# Patient Record
Sex: FEMALE | Race: WHITE | NOT HISPANIC OR LATINO | Employment: FULL TIME | URBAN - METROPOLITAN AREA
[De-identification: names, ages, dates, MRNs, and addresses within clinical notes are randomized per-mention and may not be internally consistent; named-entity substitution may affect disease eponyms.]

---

## 2017-01-30 ENCOUNTER — TELEPHONE (OUTPATIENT)
Dept: INTERNAL MEDICINE | Facility: CLINIC | Age: 22
End: 2017-01-30

## 2017-01-30 DIAGNOSIS — F11.90 NARCOTIC DRUG USE: Primary | ICD-10-CM

## 2017-02-03 ENCOUNTER — OFFICE VISIT (OUTPATIENT)
Dept: INTERNAL MEDICINE | Facility: CLINIC | Age: 22
End: 2017-02-03

## 2017-02-03 VITALS
RESPIRATION RATE: 20 BRPM | BODY MASS INDEX: 21.53 KG/M2 | TEMPERATURE: 97.5 F | HEART RATE: 72 BPM | WEIGHT: 129.38 LBS | DIASTOLIC BLOOD PRESSURE: 80 MMHG | SYSTOLIC BLOOD PRESSURE: 120 MMHG

## 2017-02-03 DIAGNOSIS — R05.9 COUGH: ICD-10-CM

## 2017-02-03 DIAGNOSIS — J01.80 OTHER ACUTE SINUSITIS: ICD-10-CM

## 2017-02-03 DIAGNOSIS — R41.840 ATTENTION AND CONCENTRATION DEFICIT: ICD-10-CM

## 2017-02-03 DIAGNOSIS — R41.840 ATTENTION OR CONCENTRATION DEFICIT: Primary | ICD-10-CM

## 2017-02-03 PROCEDURE — 99214 OFFICE O/P EST MOD 30 MIN: CPT | Performed by: INTERNAL MEDICINE

## 2017-02-03 RX ORDER — LORATADINE 10 MG/1
CAPSULE, LIQUID FILLED ORAL EVERY MORNING
COMMUNITY
End: 2017-04-07

## 2017-02-03 RX ORDER — BENZONATATE 100 MG/1
100 CAPSULE ORAL 3 TIMES DAILY PRN
Qty: 50 CAPSULE | Refills: 0 | Status: SHIPPED | OUTPATIENT
Start: 2017-02-03 | End: 2017-04-17

## 2017-02-03 RX ORDER — CETIRIZINE HYDROCHLORIDE 10 MG/1
10 TABLET ORAL
COMMUNITY
End: 2017-04-17 | Stop reason: ALTCHOICE

## 2017-02-03 RX ORDER — AZITHROMYCIN 250 MG/1
TABLET, FILM COATED ORAL
Qty: 6 TABLET | Refills: 0 | Status: SHIPPED | OUTPATIENT
Start: 2017-02-03 | End: 2017-04-07

## 2017-02-03 NOTE — PROGRESS NOTES
Subjective       Loyda Richmond is a 21 y.o. female.     Chief Complaint   Patient presents with   • ADD   • Cough     tickle cough at night  itchy throat  hoarseness    ADHD (Follow-Up): The patient's ADHD subtype is the predominantly inattentive type. Her ADHD has been well controlled since the last visit. She has had no significant interval events.   Target Symptoms:   1. Hyperactive behavior is denied.   2. Impulsive behavior is denied.   3. Difficulty concentrating is denied.   Medications: Vyvanse daily. The patient takes her medications all of the time.   Medication side effects include no anorexia, no headache, no tics, no irritability, no problems sleeping, no weight loss, no abdominal pain, no nausea and no suicidal ideation.     Cough   This is a new problem. Episode onset: 5 days ago. The problem has been gradually worsening. The cough is productive of purulent sputum (dry and hacky). Associated symptoms include headaches, nasal congestion, rhinorrhea (clear, yellow, and green) and a sore throat. Pertinent negatives include no chest pain, chills, ear pain, eye redness, fever, myalgias, postnasal drip, rash, shortness of breath or wheezing. Treatments tried: Dayquil, Nyquil, Mucinex, and Advil. The treatment provided mild relief. Her past medical history is significant for environmental allergies. There is no history of asthma.        Current Outpatient Prescriptions on File Prior to Visit   Medication Sig Dispense Refill   • clindamycin-benzoyl peroxide (BENZACLIN) 1-5 % gel Apply  topically 2 (two) times a day. 50 g 0   • desogestrel-ethinyl estradiol (KARIVA) 0.15-0.02/0.01 MG (21/5) per tablet Take 1 tablet by mouth daily. 28 tablet 11   • fluticasone (FLONASE) 50 MCG/ACT nasal spray into each nostril.     • lisdexamfetamine (VYVANSE) 50 MG capsule Take 1 capsule by mouth Daily. 30 capsule 0     No current facility-administered medications on file prior to visit.          The following portions of the  patient's history were reviewed and updated as appropriate: allergies, current medications, past family history, past medical history, past social history, past surgical history and problem list.    Review of Systems   Constitutional: Negative for appetite change, chills, fatigue and fever.   HENT: Positive for rhinorrhea (clear, yellow, and green), sore throat and voice change (hoarseness). Negative for ear pain, postnasal drip, sinus pressure and sneezing.    Eyes: Negative for pain, discharge, redness and itching.   Respiratory: Positive for cough. Negative for shortness of breath and wheezing.    Cardiovascular: Negative for chest pain.   Gastrointestinal: Negative for abdominal pain, diarrhea, nausea and vomiting.   Musculoskeletal: Negative for arthralgias, joint swelling and myalgias.   Skin: Negative for rash.   Allergic/Immunologic: Positive for environmental allergies.   Neurological: Positive for headaches.        Denies tics.     Hematological: Negative for adenopathy.   Psychiatric/Behavioral: Negative for sleep disturbance and suicidal ideas. The patient is not nervous/anxious.         Denies depression.         Objective       Blood pressure 120/80, pulse 72, temperature 97.5 °F (36.4 °C), temperature source Temporal Artery , resp. rate 20, weight 129 lb 6 oz (58.7 kg).      Physical Exam   Constitutional: She appears well-developed and well-nourished.   HENT:   Head: Normocephalic and atraumatic.   Right Ear: Tympanic membrane, external ear and ear canal normal.   Left Ear: Tympanic membrane, external ear and ear canal normal.   Mouth/Throat: Oropharynx is clear and moist and mucous membranes are normal. No oral lesions.   Tonsils normal.  No sinus tenderness to palpation.  The patient is hoarse.   Eyes: Conjunctivae are normal.   Neck: Normal range of motion. Neck supple.   Cardiovascular: Normal rate and regular rhythm.    No murmur heard.  Pulmonary/Chest: Effort normal and breath sounds normal.    Lymphadenopathy:     She has no cervical adenopathy.   Skin: No rash noted.   Psychiatric: She has a normal mood and affect.   Nursing note and vitals reviewed.    Counseling was given to patient for the following topics: instructions for management, patient and family education, impressions, risks and benefits of treatment options and importance of treatment compliance . Total time of the encounter was 20 minutes and 12 minutes was spend counseling.      Assessment / Plan:    Diagnoses and all orders for this visit:    Attention or concentration deficit    Other acute sinusitis  -     azithromycin (ZITHROMAX Z-JUSTIN) 250 MG tablet; Take 2 tablets the first day, then 1 tablet daily for 4 days.    Cough  -     benzonatate (TESSALON PERLES) 100 MG capsule; Take 1 capsule by mouth 3 (Three) Times a Day As Needed for cough.    Attention and concentration deficit  -     lisdexamfetamine (VYVANSE) 50 MG capsule; Take 1 capsule by mouth Daily.    Other orders  -     cetirizine (zyrTEC) 10 MG tablet; Take 10 mg by mouth every night at bedtime.  -     Loratadine (CLARITIN) 10 MG capsule; Take  by mouth Every Morning.    The patient was instructed in the side effects of the medication. Risks of the potential for tolerance, dependence, and addiction were discussed. The patient was instructed to take the lowest dosage of the medication, at the lowest frequency, and for the shortest period of time possible. The patient was instructed not to receive controlled substances or narcotics from other doctors, and not to giveaway or sell the medication.     The patient was instructed to abstain from illicit drug use.      Narcotics/controlled substance agreement, Charles report, and Urine Drug Screen were updated today if needed.    Return in about 3 months (around 5/3/2017) for  Annual physical, non-fasting (and Pap and follow up).

## 2017-03-03 ENCOUNTER — TELEPHONE (OUTPATIENT)
Dept: INTERNAL MEDICINE | Facility: CLINIC | Age: 22
End: 2017-03-03

## 2017-03-03 DIAGNOSIS — R41.840 ATTENTION AND CONCENTRATION DEFICIT: ICD-10-CM

## 2017-03-03 NOTE — TELEPHONE ENCOUNTER
----- Message from Jayne Fontaine sent at 3/3/2017 12:15 PM EST -----  Contact: patient  rx request: vyvanse 50 MG  Please call patient at [phone] when ready for

## 2017-04-06 ENCOUNTER — TELEPHONE (OUTPATIENT)
Dept: INTERNAL MEDICINE | Facility: CLINIC | Age: 22
End: 2017-04-06

## 2017-04-06 NOTE — TELEPHONE ENCOUNTER
----- Message from Lia Peoples MA sent at 4/6/2017  1:06 PM EDT -----    She is c/o persistant  Productive clear  cough x 1 week  Last week she had a headache .  No other symptoms.   The cough keeps her a wake a night   She has been taking Mucinex x 1 week with no relief. She is requesting something for her cough       Jose Caraballo

## 2017-04-06 NOTE — TELEPHONE ENCOUNTER
i see patient was seen back in 2/3/17 for the cough.    Since she is clinically not getting better or having recurrence, then she will need to be seen and evaluated before any medications prescribed for cough.

## 2017-04-07 ENCOUNTER — OFFICE VISIT (OUTPATIENT)
Dept: INTERNAL MEDICINE | Facility: CLINIC | Age: 22
End: 2017-04-07

## 2017-04-07 VITALS
HEART RATE: 112 BPM | TEMPERATURE: 98.3 F | SYSTOLIC BLOOD PRESSURE: 124 MMHG | RESPIRATION RATE: 18 BRPM | BODY MASS INDEX: 21.67 KG/M2 | WEIGHT: 130.25 LBS | DIASTOLIC BLOOD PRESSURE: 68 MMHG

## 2017-04-07 DIAGNOSIS — J40 BRONCHITIS: ICD-10-CM

## 2017-04-07 DIAGNOSIS — J06.9 ACUTE URI: Primary | ICD-10-CM

## 2017-04-07 PROCEDURE — 99213 OFFICE O/P EST LOW 20 MIN: CPT | Performed by: INTERNAL MEDICINE

## 2017-04-07 RX ORDER — AZITHROMYCIN 250 MG/1
TABLET, FILM COATED ORAL
Qty: 6 TABLET | Refills: 0 | Status: SHIPPED | OUTPATIENT
Start: 2017-04-07 | End: 2017-04-17

## 2017-04-07 RX ORDER — IBUPROFEN 200 MG
200 TABLET ORAL EVERY 6 HOURS PRN
COMMUNITY
End: 2018-03-21 | Stop reason: ALTCHOICE

## 2017-04-07 RX ORDER — PROMETHAZINE HYDROCHLORIDE AND CODEINE PHOSPHATE 6.25; 1 MG/5ML; MG/5ML
5 SYRUP ORAL EVERY 6 HOURS PRN
Qty: 130 ML | Refills: 0 | Status: SHIPPED | OUTPATIENT
Start: 2017-04-07 | End: 2017-04-17

## 2017-04-07 NOTE — PROGRESS NOTES
Subjective   Loyda Richmond is a 22 y.o. female.     History of Present Illness     Cough  Duration 1 week  Sx: Patient says that her cough was deep and nonproductive which then became productive mild yellow mucous.  Patient denies any fever, chills, nausea, vomiting, dyspnea on exertion, syncope, or any other systemic symptoms.    Social History: No cigarette smoking, no secondary contact    Review of Systems   All other systems reviewed and are negative.      Objective   Physical Exam   Constitutional: She appears well-developed and well-nourished.   HENT:   Head: Normocephalic.   Right Ear: External ear normal.   Left Ear: External ear normal.   Nose: Nose normal.   Mouth/Throat: Oropharynx is clear and moist.   Eyes: Conjunctivae and EOM are normal. Pupils are equal, round, and reactive to light.   Neck: Normal range of motion. Neck supple.   Cardiovascular: Normal rate, regular rhythm, normal heart sounds and intact distal pulses.    Pulmonary/Chest: Effort normal. No respiratory distress. She has no wheezes. She exhibits no tenderness.   Mild diffuse crackles in the lower bibasilar region posterior, clears with cough   Skin: Skin is warm.   Nursing note and vitals reviewed.      Assessment/Plan   Loyda was seen today for cough and uri.    Diagnoses and all orders for this visit:    Acute URI  -     phenylephrine-promethazine-codeine (PHENERGAN VC with CODEINE) 5-6.25-10 MG/5ML syrup syrup; Take 5 mL by mouth Every 6 (Six) Hours As Needed (cough).    Bronchitis  -     azithromycin (ZITHROMAX) 250 MG tablet; Take 2 tablets the first day, then 1 tablet daily for 4 days.  -     phenylephrine-promethazine-codeine (PHENERGAN VC with CODEINE) 5-6.25-10 MG/5ML syrup syrup; Take 5 mL by mouth Every 6 (Six) Hours As Needed (cough).    Supportive care  Advance diet as tolerated with emphasis on hydration.  Monitor for signs for dehydration.  Continue with Tylenol and or Motrin for fever reduction and or pain  control.  Return to clinic if symptoms do not improve.

## 2017-04-17 ENCOUNTER — OFFICE VISIT (OUTPATIENT)
Dept: INTERNAL MEDICINE | Facility: CLINIC | Age: 22
End: 2017-04-17

## 2017-04-17 VITALS
HEART RATE: 72 BPM | SYSTOLIC BLOOD PRESSURE: 114 MMHG | HEIGHT: 65 IN | WEIGHT: 126 LBS | DIASTOLIC BLOOD PRESSURE: 80 MMHG | BODY MASS INDEX: 20.99 KG/M2 | TEMPERATURE: 97.3 F | RESPIRATION RATE: 16 BRPM

## 2017-04-17 DIAGNOSIS — R41.840 ATTENTION OR CONCENTRATION DEFICIT: ICD-10-CM

## 2017-04-17 DIAGNOSIS — Z13.6 SCREENING FOR CARDIOVASCULAR CONDITION: ICD-10-CM

## 2017-04-17 DIAGNOSIS — N63.20 BREAST MASS, LEFT: ICD-10-CM

## 2017-04-17 DIAGNOSIS — Z00.00 ENCOUNTER FOR HEALTH MAINTENANCE EXAMINATION IN ADULT: Primary | ICD-10-CM

## 2017-04-17 DIAGNOSIS — Z01.419 ENCOUNTER FOR CERVICAL PAP SMEAR WITH PELVIC EXAM: ICD-10-CM

## 2017-04-17 DIAGNOSIS — R41.840 ATTENTION AND CONCENTRATION DEFICIT: ICD-10-CM

## 2017-04-17 DIAGNOSIS — J30.89 SEASONAL ALLERGIC RHINITIS DUE TO OTHER ALLERGIC TRIGGER: ICD-10-CM

## 2017-04-17 PROCEDURE — 99395 PREV VISIT EST AGE 18-39: CPT | Performed by: INTERNAL MEDICINE

## 2017-04-17 PROCEDURE — 99213 OFFICE O/P EST LOW 20 MIN: CPT | Performed by: INTERNAL MEDICINE

## 2017-04-17 RX ORDER — LEVOCETIRIZINE DIHYDROCHLORIDE 5 MG/1
5 TABLET, FILM COATED ORAL EVERY EVENING
Qty: 30 TABLET | Refills: 0
Start: 2017-04-17 | End: 2018-06-05

## 2017-04-17 NOTE — PATIENT INSTRUCTIONS
The patient agrees to return for fasting labs.    Health Maintenance, Female  Adopting a healthy lifestyle and getting preventive care can go a long way to promote health and wellness. Talk with your health care provider about what schedule of regular examinations is right for you. This is a good chance for you to check in with your provider about disease prevention and staying healthy.  In between checkups, there are plenty of things you can do on your own. Experts have done a lot of research about which lifestyle changes and preventive measures are most likely to keep you healthy. Ask your health care provider for more information.  WEIGHT AND DIET   Eat a healthy diet  · Be sure to include plenty of vegetables, fruits, low-fat dairy products, and lean protein.  · Do not eat a lot of foods high in solid fats, added sugars, or salt.  · Get regular exercise. This is one of the most important things you can do for your health.  ¨ Most adults should exercise for at least 150 minutes each week. The exercise should increase your heart rate and make you sweat (moderate-intensity exercise).  ¨ Most adults should also do strengthening exercises at least twice a week. This is in addition to the moderate-intensity exercise.    Maintain a healthy weight  · Body mass index (BMI) is a measurement that can be used to identify possible weight problems. It estimates body fat based on height and weight. Your health care provider can help determine your BMI and help you achieve or maintain a healthy weight.  · For females 20 years of age and older:      A BMI below 18.5 is considered underweight.    A BMI of 18.5 to 24.9 is normal.    A BMI of 25 to 29.9 is considered overweight.    A BMI of 30 and above is considered obese.    Watch levels of cholesterol and blood lipids  · You should start having your blood tested for lipids and cholesterol at 20 years of age, then have this test every 5 years.  · You may need to have your  cholesterol levels checked more often if:  ¨ Your lipid or cholesterol levels are high.  ¨ You are older than 50 years of age.  ¨ You are at high risk for heart disease.    CANCER SCREENING      Lung Cancer  · Lung cancer screening is recommended for adults 55-80 years old who are at high risk for lung cancer because of a history of smoking.  · A yearly low-dose CT scan of the lungs is recommended for people who:  ¨ Currently smoke.  ¨ Have quit within the past 15 years.  ¨ Have at least a 30-pack-year history of smoking. A pack year is smoking an average of one pack of cigarettes a day for 1 year.  · Yearly screening should continue until it has been 15 years since you quit.  · Yearly screening should stop if you develop a health problem that would prevent you from having lung cancer treatment.    Breast Cancer  · Practice breast self-awareness. This means understanding how your breasts normally appear and feel.  · It also means doing regular breast self-exams. Let your health care provider know about any changes, no matter how small.  · If you are in your 20s or 30s, you should have a clinical breast exam (CBE) by a health care provider every 1-3 years as part of a regular health exam.  · If you are 40 or older, have a CBE every year. Also consider having a breast X-ray (mammogram) every year.  · If you have a family history of breast cancer, talk to your health care provider about genetic screening.  · If you are at high risk for breast cancer, talk to your health care provider about having an MRI and a mammogram every year.  · Breast cancer gene (BRCA) assessment is recommended for women who have family members with BRCA-related cancers. BRCA-related cancers include:  ¨ Breast.  ¨ Ovarian.  ¨ Tubal.  ¨ Peritoneal cancers.  · Results of the assessment will determine the need for genetic counseling and BRCA1 and BRCA2 testing.  Cervical Cancer  Your health care provider may recommend that you be screened regularly  for cancer of the pelvic organs (ovaries, uterus, and vagina). This screening involves a pelvic examination, including checking for microscopic changes to the surface of your cervix (Pap test). You may be encouraged to have this screening done every 3 years, beginning at age 21.  · For women ages 30-65, health care providers may recommend pelvic exams and Pap testing every 3 years, or they may recommend the Pap and pelvic exam, combined with testing for human papilloma virus (HPV), every 5 years. Some types of HPV increase your risk of cervical cancer. Testing for HPV may also be done on women of any age with unclear Pap test results.  · Other health care providers may not recommend any screening for nonpregnant women who are considered low risk for pelvic cancer and who do not have symptoms. Ask your health care provider if a screening pelvic exam is right for you.  · If you have had past treatment for cervical cancer or a condition that could lead to cancer, you need Pap tests and screening for cancer for at least 20 years after your treatment. If Pap tests have been discontinued, your risk factors (such as having a new sexual partner) need to be reassessed to determine if screening should resume. Some women have medical problems that increase the chance of getting cervical cancer. In these cases, your health care provider may recommend more frequent screening and Pap tests.  Colorectal Cancer  · This type of cancer can be detected and often prevented.  · Routine colorectal cancer screening usually begins at 50 years of age and continues through 75 years of age.  · Your health care provider may recommend screening at an earlier age if you have risk factors for colon cancer.  · Your health care provider may also recommend using home test kits to check for hidden blood in the stool.  · A small camera at the end of a tube can be used to examine your colon directly (sigmoidoscopy or colonoscopy). This is done to check  for the earliest forms of colorectal cancer.  · Routine screening usually begins at age 50.  · Direct examination of the colon should be repeated every 5-10 years through 75 years of age. However, you may need to be screened more often if early forms of precancerous polyps or small growths are found.  Skin Cancer  · Check your skin from head to toe regularly.  · Tell your health care provider about any new moles or changes in moles, especially if there is a change in a mole's shape or color.  · Also tell your health care provider if you have a mole that is larger than the size of a pencil eraser.  · Always use sunscreen. Apply sunscreen liberally and repeatedly throughout the day.  · Protect yourself by wearing long sleeves, pants, a wide-brimmed hat, and sunglasses whenever you are outside.  HEART DISEASE, DIABETES, AND HIGH BLOOD PRESSURE   · High blood pressure causes heart disease and increases the risk of stroke. High blood pressure is more likely to develop in:    People who have blood pressure in the high end of the normal range (130-139/85-89 mm Hg).    People who are overweight or obese.    People who are .  · If you are 18-39 years of age, have your blood pressure checked every 3-5 years. If you are 40 years of age or older, have your blood pressure checked every year. You should have your blood pressure measured twice--once when you are at a hospital or clinic, and once when you are not at a hospital or clinic. Record the average of the two measurements. To check your blood pressure when you are not at a hospital or clinic, you can use:    An automated blood pressure machine at a pharmacy.    A home blood pressure monitor.  · If you are between 55 years and 79 years old, ask your health care provider if you should take aspirin to prevent strokes.  · Have regular diabetes screenings. This involves taking a blood sample to check your fasting blood sugar level.    If you are at a normal  weight and have a low risk for diabetes, have this test once every three years after 45 years of age.    If you are overweight and have a high risk for diabetes, consider being tested at a younger age or more often.  PREVENTING INFECTION   Hepatitis B  · If you have a higher risk for hepatitis B, you should be screened for this virus. You are considered at high risk for hepatitis B if:    You were born in a country where hepatitis B is common. Ask your health care provider which countries are considered high risk.    Your parents were born in a high-risk country, and you have not been immunized against hepatitis B (hepatitis B vaccine).    You have HIV or AIDS.    You use needles to inject street drugs.    You live with someone who has hepatitis B.    You have had sex with someone who has hepatitis B.    You get hemodialysis treatment.    You take certain medicines for conditions, including cancer, organ transplantation, and autoimmune conditions.  Hepatitis C  · Blood testing is recommended for:  ¨ Everyone born from 1945 through 1965.  ¨ Anyone with known risk factors for hepatitis C.  Sexually transmitted infections (STIs)  · You should be screened for sexually transmitted infections (STIs) including gonorrhea and chlamydia if:  ¨ You are sexually active and are younger than 24 years of age.  ¨ You are older than 24 years of age and your health care provider tells you that you are at risk for this type of infection.  ¨ Your sexual activity has changed since you were last screened and you are at an increased risk for chlamydia or gonorrhea. Ask your health care provider if you are at risk.    If you do not have HIV, but are at risk, it may be recommended that you take a prescription medicine daily to prevent HIV infection. This is called pre-exposure prophylaxis (PrEP). You are considered at risk if:    You are sexually active and do not regularly use condoms or know the HIV status of your partner(s).    You take  drugs by injection.    You are sexually active with a partner who has HIV.  Talk with your health care provider about whether you are at high risk of being infected with HIV. If you choose to begin PrEP, you should first be tested for HIV. You should then be tested every 3 months for as long as you are taking PrEP.   PREGNANCY   · If you are premenopausal and you may become pregnant, ask your health care provider about preconception counseling.  · If you may become pregnant, take 400 to 800 micrograms (mcg) of folic acid every day.  · If you want to prevent pregnancy, talk to your health care provider about birth control (contraception).  OSTEOPOROSIS AND MENOPAUSE   · Osteoporosis is a disease in which the bones lose minerals and strength with aging. This can result in serious bone fractures. Your risk for osteoporosis can be identified using a bone density scan.  · If you are 65 years of age or older, or if you are at risk for osteoporosis and fractures, ask your health care provider if you should be screened.  · Ask your health care provider whether you should take a calcium or vitamin D supplement to lower your risk for osteoporosis.  · Menopause may have certain physical symptoms and risks.  · Hormone replacement therapy may reduce some of these symptoms and risks.  Talk to your health care provider about whether hormone replacement therapy is right for you.   HOME CARE INSTRUCTIONS   · Schedule regular health, dental, and eye exams.  · Stay current with your immunizations.    · Do not use any tobacco products including cigarettes, chewing tobacco, or electronic cigarettes.  · If you are pregnant, do not drink alcohol.  · If you are breastfeeding, limit how much and how often you drink alcohol.  · Limit alcohol intake to no more than 1 drink per day for nonpregnant women. One drink equals 12 ounces of beer, 5 ounces of wine, or 1½ ounces of hard liquor.  · Do not use street drugs.  · Do not share  needles.  · Ask your health care provider for help if you need support or information about quitting drugs.  · Tell your health care provider if you often feel depressed.  · Tell your health care provider if you have ever been abused or do not feel safe at home.     This information is not intended to replace advice given to you by your health care provider. Make sure you discuss any questions you have with your health care provider.     Document Released: 07/02/2012 Document Revised: 01/08/2016 Document Reviewed: 11/19/2014  [employer] Interactive Patient Education ©2016 [employer]

## 2017-04-17 NOTE — PROGRESS NOTES
Subjective   History of Present Illness    History obtained from the patient.    ADHD (Follow-Up): The patient's ADHD subtype is the predominantly inattentive type. Her ADHD has been well controlled since the last visit.   Interval Events:  She has increased stress at work, and is having problems with focus and concentration.  She is easily distracted.   Target Symptoms:   1. Hyperactive behavior is denied.   2. Impulsive behavior is denied.   3. Difficulty concentrating is denied.   Medications: Vyvanse daily. The patient takes her medications all of the time.   Medication side effects include no anorexia, no headache, no tics, no irritability, no problems sleeping, no weight loss, no abdominal pain, no nausea and no suicidal ideation.       Loyda Richmond is a 22 y.o. female who presents for an Annual Physical.      The patient presents for a  Pap Smear. Menarche was age 13-14. She is G [0] P [0 ]. Her menstrual cycles [are] regular. Last menstrual period was [2/28/17, approx ]. She [denies] dysmenorrhea. She [denies] menorrhagia. She [denies] dyspareunia. She [is not] currently sexually active. She has had 6 LTSP's. She uses [oral contraceptive pills and condoms] for birth control. Her last Pap smear was [4/4/16, ASCUS, candida+, and chlamydia +]. She was treated at , and states she was re-checked.  She [denies] othersexually-transmitted diseases and HPV in the past. She [denies] SANDRA Exposure in utero.     She [does not] do self breast exam. She [denies) breast mass, breast pain,, or nipple discharge. She has always had an intermittent left inverted nipple.  Last Mammogram was [N/A ]. Last DEXA Scan was [N/A ]. Last Colonoscopy was [N/A ].    She [has] had the Gardasil vaccination.     She [denies] a family history of Colon Cancer, Colon Polyps, Breast Cancer, Uterine Cancer, Cervical Cancer, or Ovarian Cancer.      PMH, PSH, SocHx, FamHx, Allergies, and Medications: Reviewed and updated.    Outpatient  Medications Prior to Visit   Medication Sig Dispense Refill   • cetirizine (zyrTEC) 10 MG tablet Take 10 mg by mouth every night at bedtime.     • clindamycin-benzoyl peroxide (BENZACLIN) 1-5 % gel Apply  topically 2 (two) times a day. 50 g 0   • desogestrel-ethinyl estradiol (KARIVA) 0.15-0.02/0.01 MG (21/5) per tablet Take 1 tablet by mouth daily. 28 tablet 11   • fluticasone (FLONASE) 50 MCG/ACT nasal spray into each nostril.     • ibuprofen (ADVIL,MOTRIN) 200 MG tablet Take 200 mg by mouth Every 6 (Six) Hours As Needed for Mild Pain (1-3).     • lisdexamfetamine (VYVANSE) 50 MG capsule Take 1 capsule by mouth Daily. 30 capsule 0   • azithromycin (ZITHROMAX) 250 MG tablet Take 2 tablets the first day, then 1 tablet daily for 4 days. 6 tablet 0   • benzonatate (TESSALON PERLES) 100 MG capsule Take 1 capsule by mouth 3 (Three) Times a Day As Needed for cough. 50 capsule 0   • promethazine-codeine (PHENERGAN with CODEINE) 6.25-10 MG/5ML syrup Take 5 mL by mouth Every 6 (Six) Hours As Needed for Cough. 130 mL 0     No facility-administered medications prior to visit.        Immunization History   Administered Date(s) Administered   • HPV Quadrivalent 07/24/2007, 09/28/2007, 01/31/2008   • Hepatitis A 07/12/2006, 07/24/2007   • Hepatitis B 1994, 1994, 1994   • Meningococcal Conjugate 07/11/2005, 08/03/2011   • Tdap 07/11/2005, 07/30/2009         Patient Active Problem List   Diagnosis   • Acne   • Allergic rhinitis   • Attention or concentration deficit   • Disorder of vocal cord   • Low back pain   • Chronic diarrhea   • Hoarseness       Health Habits:  Dental Exam. not up to date - last 2 years ago  Eye Exam. up to date  Hearing Loss:  No  Exercise: 7 times/week.  Current exercise activities include: walks the dog and walks up a hill to work  Diet: Healthy  Multivitamin: Yes, some    Safe Driving:  Yes  Seat Belt:  Yes  Bike Helmet:  N/A  Skin Screening:  Yes  Sunscreen: Yes  SBE / GEMMA: No  Sexual  Activity:  No  Birth Control:  ocp's  STD Prevention:  condoms    Last Pap: 4/4/16, ASCUS (candida+, chlamydia+)  Last Mammogram:  N/A  Last DEXA Scan: N/A  Last Colonoscopy: N/A  Last PSA: N/A    Social:    Social History     Social History   • Marital status: Single     Spouse name: N/A   • Number of children: 0   • Years of education: N/A     Occupational History   • Not on file.     Social History Main Topics   • Smoking status: Never Smoker   • Smokeless tobacco: Never   • Alcohol use Occasional liquor   • Drug use: None   • Sexual activity: Not on file     Other Topics Concern   • Not on file     Social History Narrative         Current Medical Providers:    Yoanna Dejesus MD (Internal Medicine / Pediatrics)    The [facility] providers who are involved in the care of this patient are listed above.         Review of Systems   Constitutional: Negative for chills, fatigue, fever and unexpected weight change.        No weight gain or weight loss.  No night sweats.  No generalized pain.   HENT: Negative for congestion, ear pain, hearing loss, nosebleeds, postnasal drip, rhinorrhea, sinus pressure, sneezing, sore throat, tinnitus and voice change.         Denies snoring.  Had a URI 2-3 weeks ago, resolved.  Just started taking Xyzal instead of Zyrtec.   Eyes: Negative for photophobia, pain, discharge, redness, itching and visual disturbance.   Respiratory: Negative for cough, chest tightness, shortness of breath, wheezing and stridor.         No orthopnea, dyspnea on exertion, or PND.  No chest congestion.   No  hemoptysis.   Cardiovascular: Negative for chest pain, palpitations and leg swelling.        No claudication or syncope.   Gastrointestinal: Negative for abdominal pain, blood in stool, constipation, diarrhea, nausea, rectal pain and vomiting.        No hematemesis.  No heartburn, dysphagia or odynophagia.  No belching or bloating.  No melena.   Endocrine: Negative for cold intolerance, heat intolerance,  "polydipsia, polyphagia and polyuria.        No hair loss or dry skin.  No generalized weakness.  No hot flashes.   Genitourinary: Negative for difficulty urinating, dyspareunia, dysuria, flank pain, frequency, hematuria, menstrual problem, pelvic pain, urgency, vaginal bleeding and vaginal discharge.        No nocturia, incomplete emptying, or incontinence.   Musculoskeletal: Negative for arthralgias, back pain, gait problem, joint swelling, myalgias, neck pain and neck stiffness.        No joint stiffness.   Skin: Negative for rash.        No new skin lesions, but she has a mole on the right lower quadrant abdomen which has gotten darker. No breast pain or masses.  No nipple discharge or nipple inversion.   Neurological: Negative for dizziness, tremors, syncope, speech difficulty, weakness, light-headedness, numbness and headaches.        No tingling.  No memory loss.  No decreased concentration.   Hematological: Negative for adenopathy. Does not bruise/bleed easily.   Psychiatric/Behavioral: Negative for confusion, sleep disturbance and suicidal ideas. The patient is not nervous/anxious.         No depression.           Objective     Vitals:    04/17/17 1810   BP: 114/80   BP Location: Right arm   Pulse: 72   Resp: 16   Temp: 97.3 °F (36.3 °C)   TempSrc: Temporal Artery    Weight: 126 lb (57.2 kg)   Height: 65\" (165.1 cm)       Body mass index is 20.97 kg/(m^2).    Physical Exam   Constitutional: She appears well-developed and well-nourished.   HENT:   Head: Normocephalic and atraumatic.   Right Ear: Tympanic membrane, external ear and ear canal normal.   Left Ear: Tympanic membrane, external ear and ear canal normal.   Mouth/Throat: Oropharynx is clear and moist. No oropharyngeal exudate.   Eyes: Conjunctivae and EOM are normal. Pupils are equal, round, and reactive to light.   Neck: Normal range of motion. Neck supple. No thyromegaly present.   Cardiovascular: Normal rate, regular rhythm and intact distal " pulses.  Exam reveals no gallop and no friction rub.    No murmur heard.  Pulmonary/Chest: Effort normal and breath sounds normal. Right breast exhibits no inverted nipple, no mass, no nipple discharge, no skin change and no tenderness. Left breast exhibits mass (3:00, tender). Left breast exhibits no inverted nipple, no nipple discharge, no skin change and no tenderness.   Abdominal: Soft. Bowel sounds are normal. She exhibits no distension and no mass. There is no tenderness. There is no rebound and no guarding.   Rectal exam deferred.   Genitourinary: Rectum normal, vagina normal and uterus normal. Uterus is not tender. Cervix exhibits no motion tenderness, no discharge and no friability. Right adnexum displays no mass and no tenderness. Left adnexum displays no mass and no tenderness. No vaginal discharge found.   Genitourinary Comments: Cervix is without lesions or erythema.  No uterine or adnexal masses or tenderness to palpation.   Musculoskeletal: Normal range of motion. She exhibits no edema or tenderness.   Lymphadenopathy:     She has no cervical adenopathy.        Right cervical: No posterior cervical adenopathy present.       Left cervical: No posterior cervical adenopathy present.     She has no axillary adenopathy.        Right: No inguinal and no supraclavicular adenopathy present.        Left: No inguinal and no supraclavicular adenopathy present.   Neurological: She is alert. She has normal strength and normal reflexes. She displays normal reflexes. No cranial nerve deficit. She exhibits normal muscle tone. Coordination and gait normal.   Skin: No lesion and no rash noted.   There is a slight raised brown lesion on the right hip.  No other atypical skin lesions.   Psychiatric: She has a normal mood and affect.   Nursing note and vitals reviewed.          Assessment/Plan     Diagnoses and all orders for this visit:    Seasonal allergic rhinitis due to other allergic trigger  -     levocetirizine  (XYZAL) 5 MG tablet; Take 1 tablet by mouth Every Evening.    Encounter for health maintenance examination in adult    Encounter for cervical Pap smear with pelvic exam  -     Liquid-based Pap Smear, Screening    Attention or concentration deficit    Breast mass, left  -     US breast left complete; Future    Attention and concentration deficit  -     lisdexamfetamine (VYVANSE) 60 MG capsule; Take 1 capsule by mouth Daily.    Screening for cardiovascular condition  -     Lipid Panel; Future  -     Comprehensive Metabolic Panel; Future  -     CBC & Differential; Future  -     TSH; Future      The patient was instructed in the side effects of the medication. Risks of the potential for tolerance, dependence, and addiction were discussed. The patient was instructed to take the lowest dosage of the medication, at the lowest frequency, and for the shortest period of time possible. The patient was instructed not to receive controlled substances or narcotics from other doctors, and not to giveaway or sell the medication.      The patient was instructed to abstain from illicit drug use.       Narcotics/controlled substance agreement, Charles report, and Urine Drug Screen were updated today if needed.      Return in about 3 months (around 7/17/2017) for Recheck-ADD.

## 2017-04-20 ENCOUNTER — HOSPITAL ENCOUNTER (OUTPATIENT)
Dept: ULTRASOUND IMAGING | Facility: HOSPITAL | Age: 22
Discharge: HOME | End: 2017-04-20
Attending: INTERNAL MEDICINE | Admitting: INTERNAL MEDICINE

## 2017-04-20 DIAGNOSIS — N63.20 BREAST MASS, LEFT: ICD-10-CM

## 2017-04-20 PROCEDURE — 76642 ULTRASOUND BREAST LIMITED: CPT

## 2017-04-20 PROCEDURE — 76642 ULTRASOUND BREAST LIMITED: CPT | Performed by: RADIOLOGY

## 2017-04-24 ENCOUNTER — TELEPHONE (OUTPATIENT)
Dept: INTERNAL MEDICINE | Facility: CLINIC | Age: 22
End: 2017-04-24

## 2017-04-24 ENCOUNTER — LAB (OUTPATIENT)
Dept: INTERNAL MEDICINE | Facility: CLINIC | Age: 22
End: 2017-04-24

## 2017-04-24 DIAGNOSIS — Z13.6 SCREENING FOR CARDIOVASCULAR CONDITION: ICD-10-CM

## 2017-04-24 DIAGNOSIS — F11.90 NARCOTIC DRUG USE: ICD-10-CM

## 2017-04-24 LAB
ALBUMIN SERPL-MCNC: 4.2 G/DL (ref 3.2–4.8)
ALBUMIN/GLOB SERPL: 1.6 G/DL (ref 1.5–2.5)
ALP SERPL-CCNC: 37 U/L (ref 25–100)
ALT SERPL W P-5'-P-CCNC: 13 U/L (ref 7–40)
ANION GAP SERPL CALCULATED.3IONS-SCNC: 6 MMOL/L (ref 3–11)
ARTICHOKE IGE QN: 70 MG/DL (ref 0–130)
AST SERPL-CCNC: 22 U/L (ref 0–33)
BASOPHILS # BLD AUTO: 0.05 10*3/MM3 (ref 0–0.2)
BASOPHILS NFR BLD AUTO: 0.8 % (ref 0–1)
BILIRUB SERPL-MCNC: 0.6 MG/DL (ref 0.3–1.2)
BUN BLD-MCNC: 10 MG/DL (ref 9–23)
BUN/CREAT SERPL: 12.5 (ref 7–25)
CALCIUM SPEC-SCNC: 10.1 MG/DL (ref 8.7–10.4)
CHLORIDE SERPL-SCNC: 105 MMOL/L (ref 99–109)
CHOLEST SERPL-MCNC: 155 MG/DL (ref 0–200)
CO2 SERPL-SCNC: 30 MMOL/L (ref 20–31)
CREAT BLD-MCNC: 0.8 MG/DL (ref 0.6–1.3)
DEPRECATED RDW RBC AUTO: 38.4 FL (ref 37–54)
EOSINOPHIL # BLD AUTO: 0.74 10*3/MM3 (ref 0.1–0.3)
EOSINOPHIL NFR BLD AUTO: 12 % (ref 0–3)
ERYTHROCYTE [DISTWIDTH] IN BLOOD BY AUTOMATED COUNT: 12 % (ref 11.3–14.5)
GFR SERPL CREATININE-BSD FRML MDRD: 89 ML/MIN/1.73
GLOBULIN UR ELPH-MCNC: 2.7 GM/DL
GLUCOSE BLD-MCNC: 88 MG/DL (ref 70–100)
HCT VFR BLD AUTO: 40.8 % (ref 34.5–44)
HDLC SERPL-MCNC: 69 MG/DL (ref 40–60)
HGB BLD-MCNC: 13.7 G/DL (ref 11.5–15.5)
IMM GRANULOCYTES # BLD: 0.02 10*3/MM3 (ref 0–0.03)
IMM GRANULOCYTES NFR BLD: 0.3 % (ref 0–0.6)
LYMPHOCYTES # BLD AUTO: 1.93 10*3/MM3 (ref 0.6–4.8)
LYMPHOCYTES NFR BLD AUTO: 31.2 % (ref 24–44)
MCH RBC QN AUTO: 30 PG (ref 27–31)
MCHC RBC AUTO-ENTMCNC: 33.6 G/DL (ref 32–36)
MCV RBC AUTO: 89.3 FL (ref 80–99)
MONOCYTES # BLD AUTO: 0.46 10*3/MM3 (ref 0–1)
MONOCYTES NFR BLD AUTO: 7.4 % (ref 0–12)
NEUTROPHILS # BLD AUTO: 2.99 10*3/MM3 (ref 1.5–8.3)
NEUTROPHILS NFR BLD AUTO: 48.3 % (ref 41–71)
PLATELET # BLD AUTO: 250 10*3/MM3 (ref 150–450)
PMV BLD AUTO: 10.1 FL (ref 6–12)
POTASSIUM BLD-SCNC: 4.1 MMOL/L (ref 3.5–5.5)
PROT SERPL-MCNC: 6.9 G/DL (ref 5.7–8.2)
RBC # BLD AUTO: 4.57 10*6/MM3 (ref 3.89–5.14)
SODIUM BLD-SCNC: 141 MMOL/L (ref 132–146)
TRIGL SERPL-MCNC: 107 MG/DL (ref 0–150)
TSH SERPL DL<=0.05 MIU/L-ACNC: 1.85 MIU/ML (ref 0.35–5.35)
WBC NRBC COR # BLD: 6.19 10*3/MM3 (ref 3.5–10.8)

## 2017-04-24 PROCEDURE — 80061 LIPID PANEL: CPT | Performed by: INTERNAL MEDICINE

## 2017-04-24 PROCEDURE — 36415 COLL VENOUS BLD VENIPUNCTURE: CPT | Performed by: INTERNAL MEDICINE

## 2017-04-24 PROCEDURE — 84443 ASSAY THYROID STIM HORMONE: CPT | Performed by: INTERNAL MEDICINE

## 2017-04-24 PROCEDURE — 80053 COMPREHEN METABOLIC PANEL: CPT | Performed by: INTERNAL MEDICINE

## 2017-04-24 PROCEDURE — 85025 COMPLETE CBC W/AUTO DIFF WBC: CPT | Performed by: INTERNAL MEDICINE

## 2017-04-24 NOTE — TELEPHONE ENCOUNTER
----- Message from Angy Wood sent at 4/24/2017 12:40 PM EDT -----  PT STOPPED IN OFFICE AND WANTED TO KNOW IF SHE COULD HAVE A LETTER FOR HER APARTMENT PLACE STATING THAT HER DOG IS NEEDED FOR EMOTIONAL SUPPORT. SHE CAN BE REACHED [phone]

## 2017-04-24 NOTE — TELEPHONE ENCOUNTER
Call please.  Need more information.  I was not aware that she had a diagnosis of Anxiety or Depression.  In addition, my understanding is there is specific criteria to be met to consider a pat as a  animal.

## 2017-04-25 NOTE — TELEPHONE ENCOUNTER
Spoke with pt. States that she wanted the letter for her dog to help waive the pet fee at her apartment complex. I explained to pt that without dx of anxiety or depression we cannot give her a letter stating her dog is needed for emotional support. Verbal understanding from pt received.

## 2017-05-19 ENCOUNTER — TELEPHONE (OUTPATIENT)
Dept: INTERNAL MEDICINE | Facility: CLINIC | Age: 22
End: 2017-05-19

## 2017-05-19 DIAGNOSIS — R41.840 ATTENTION AND CONCENTRATION DEFICIT: ICD-10-CM

## 2017-06-23 ENCOUNTER — TELEPHONE (OUTPATIENT)
Dept: INTERNAL MEDICINE | Facility: CLINIC | Age: 22
End: 2017-06-23

## 2017-06-23 NOTE — TELEPHONE ENCOUNTER
----- Message from Suzette Pritchett sent at 6/23/2017  1:24 PM EDT -----  [phone]    NEEDS REFILL OF VYVANSE 60 MG    LADONNA LOPEZ

## 2017-06-26 DIAGNOSIS — R41.840 ATTENTION AND CONCENTRATION DEFICIT: ICD-10-CM

## 2017-07-17 ENCOUNTER — OFFICE VISIT (OUTPATIENT)
Dept: INTERNAL MEDICINE | Facility: CLINIC | Age: 22
End: 2017-07-17

## 2017-07-17 VITALS
TEMPERATURE: 98.2 F | WEIGHT: 125 LBS | SYSTOLIC BLOOD PRESSURE: 112 MMHG | BODY MASS INDEX: 20.8 KG/M2 | DIASTOLIC BLOOD PRESSURE: 74 MMHG | RESPIRATION RATE: 16 BRPM | HEART RATE: 84 BPM

## 2017-07-17 DIAGNOSIS — Z30.9 ENCOUNTER FOR CONTRACEPTIVE MANAGEMENT, UNSPECIFIED CONTRACEPTIVE ENCOUNTER TYPE: ICD-10-CM

## 2017-07-17 DIAGNOSIS — R41.840 ATTENTION AND CONCENTRATION DEFICIT: Primary | ICD-10-CM

## 2017-07-17 PROCEDURE — 99213 OFFICE O/P EST LOW 20 MIN: CPT | Performed by: INTERNAL MEDICINE

## 2017-07-27 ENCOUNTER — TELEPHONE (OUTPATIENT)
Dept: INTERNAL MEDICINE | Facility: CLINIC | Age: 22
End: 2017-07-27

## 2017-07-27 DIAGNOSIS — R41.840 ATTENTION AND CONCENTRATION DEFICIT: ICD-10-CM

## 2017-07-27 NOTE — TELEPHONE ENCOUNTER
----- Message from Ya Kearney sent at 7/27/2017  2:09 PM EDT -----  Contact: PATIENT  PATIENT WANTS A REFILL FOR GYCANSE-60 MG. A GOOD CALL BACK NUMBER [phone]. THANK YOU.

## 2017-07-31 ENCOUNTER — TELEPHONE (OUTPATIENT)
Dept: INTERNAL MEDICINE | Facility: CLINIC | Age: 22
End: 2017-07-31

## 2017-07-31 NOTE — TELEPHONE ENCOUNTER
----- Message from Angy Wood sent at 7/31/2017 12:27 PM EDT -----  Pt wants to switch her [facility] in [place]

## 2017-09-05 ENCOUNTER — TELEPHONE (OUTPATIENT)
Dept: INTERNAL MEDICINE | Facility: CLINIC | Age: 22
End: 2017-09-05

## 2017-09-05 DIAGNOSIS — R41.840 ATTENTION AND CONCENTRATION DEFICIT: ICD-10-CM

## 2017-09-05 NOTE — TELEPHONE ENCOUNTER
----- Message from Gardenia Roth sent at 9/5/2017 12:04 PM EDT -----  [phone]  REFILL ON VYVANSE 50 MG   CALL PT WHEN READY FOR

## 2017-09-05 NOTE — TELEPHONE ENCOUNTER
PT ASKED FOR 50 MG , THE 60 MG IS LIKE 200 MORE . PLEASE REWRITE SCRIPT FOR 50MG AND I HAVE HER OLD SCRIPT , I WILL GIVE TO YOU IN AM , CALL PT WHEN READY

## 2017-10-16 ENCOUNTER — TELEPHONE (OUTPATIENT)
Dept: INTERNAL MEDICINE | Facility: CLINIC | Age: 22
End: 2017-10-16

## 2017-10-16 DIAGNOSIS — R41.840 ATTENTION AND CONCENTRATION DEFICIT: ICD-10-CM

## 2017-10-16 NOTE — TELEPHONE ENCOUNTER
----- Message from Angy Powell sent at 10/16/2017  9:26 AM EDT -----  PT CALLED NEEDING A REFILL ON RX VYVANSE. SHE CAN BE REACHED [phone]

## 2017-10-30 ENCOUNTER — OFFICE VISIT (OUTPATIENT)
Dept: INTERNAL MEDICINE | Facility: CLINIC | Age: 22
End: 2017-10-30

## 2017-10-30 VITALS
RESPIRATION RATE: 20 BRPM | DIASTOLIC BLOOD PRESSURE: 64 MMHG | TEMPERATURE: 97 F | BODY MASS INDEX: 21.72 KG/M2 | SYSTOLIC BLOOD PRESSURE: 110 MMHG | WEIGHT: 130.5 LBS | HEART RATE: 72 BPM

## 2017-10-30 DIAGNOSIS — R41.840 ATTENTION OR CONCENTRATION DEFICIT: Primary | ICD-10-CM

## 2017-10-30 PROCEDURE — 99213 OFFICE O/P EST LOW 20 MIN: CPT | Performed by: INTERNAL MEDICINE

## 2017-10-30 NOTE — PROGRESS NOTES
Subjective       Loyda Richmond is a 22 y.o. female.     Chief Complaint   Patient presents with   • ADD     3 month        History obtained from the patient.      History of Present Illness     ADHD (Follow-Up): The patient's ADHD subtype is the predominantly inattentive type. Her ADHD has been well controlled since the last visit.   Interval Events: Her Vyvanse is now $250.  She has been out for one week.  Target Symptoms: one week.  1. Hyperactive behavior is denied.   2. Impulsive behavior is denied.   3. Difficulty concentrating is denied.   Medications: Vyvanse daily. The patient takes her medications all of the time.   Medication side effects include no anorexia, no headache, no tics, no irritability, no problems sleeping, no weight loss, no abdominal pain, no nausea and no suicidal ideation    Current Outpatient Prescriptions on File Prior to Visit   Medication Sig Dispense Refill   • clindamycin-benzoyl peroxide (BENZACLIN) 1-5 % gel Apply  topically 2 (two) times a day. 50 g 0   • ibuprofen (ADVIL,MOTRIN) 200 MG tablet Take 200 mg by mouth Every 6 (Six) Hours As Needed for Mild Pain (1-3).     • levocetirizine (XYZAL) 5 MG tablet Take 1 tablet by mouth Every Evening. 30 tablet 0   • lisdexamfetamine (VYVANSE) 50 MG capsule Take 1 capsule by mouth Daily. 30 capsule 0   • fluticasone (FLONASE) 50 MCG/ACT nasal spray into each nostril.     • [DISCONTINUED] desogestrel-ethinyl estradiol (KARIVA) 0.15-0.02/0.01 MG (21/5) per tablet Take 1 tablet by mouth daily. 28 tablet 11     No current facility-administered medications on file prior to visit.          The following portions of the patient's history were reviewed and updated as appropriate: allergies, current medications, past family history, past medical history, past social history, past surgical history and problem list.    Review of Systems   Constitutional: Positive for fatigue. Negative for appetite change and unexpected weight change.   Respiratory:  Negative for shortness of breath.    Cardiovascular: Negative for chest pain and palpitations.   Gastrointestinal: Negative for abdominal pain, diarrhea, nausea and vomiting.   Neurological:        No tics.  No memory loss.   Psychiatric/Behavioral: Negative for agitation, confusion, decreased concentration, sleep disturbance and suicidal ideas. The patient is not nervous/anxious.         No depression.         Objective       Blood pressure 110/64, pulse 72, temperature 97 °F (36.1 °C), temperature source Temporal Artery , resp. rate 20, weight 130 lb 8 oz (59.2 kg).      Physical Exam   Constitutional: She appears well-developed and well-nourished.   Cardiovascular: Normal rate, regular rhythm and normal heart sounds.    No murmur heard.  Pulmonary/Chest: Effort normal and breath sounds normal.   Neurological: She is alert.   Psychiatric: She has a normal mood and affect.   Nursing note and vitals reviewed.    Counseling was given to patient for the following topics: instructions for management, patient and family education, impressions, risks and benefits of treatment options and importance of treatment compliance . Total time of the encounter was 15 minutes and 12 minutes was spend counseling.    Assessment / Plan:    Loyda was seen today for add.    Diagnoses and all orders for this visit:    Attention or concentration deficit    The patient states she will fill the rx this month and do research on an alternative medication before the next visit.    The patient was instructed in the side effects of the medication.  Risks of the potential for tolerance, dependence, and addiction were discussed.  The patient was instructed to take the lowest dosage of the medication, at the lowest frequency, and for the shortest period of time possible.  The patient was instructed not to receive controlled substances or narcotics from other doctors, and not to giveaway or sell the medication.     The patient was instructed to  abstain from illicit drug use.  The patient was instructed to avoid alcohol while taking these medications.       Narcotics/controlled substance agreement, Charles report, and Urine Drug Screen were updated today if needed.      Return in about 3 months (around 1/30/2018) for Recheck-ADD.

## 2017-11-27 ENCOUNTER — TELEPHONE (OUTPATIENT)
Dept: INTERNAL MEDICINE | Facility: CLINIC | Age: 22
End: 2017-11-27

## 2017-11-27 DIAGNOSIS — R41.840 ATTENTION AND CONCENTRATION DEFICIT: ICD-10-CM

## 2017-11-27 NOTE — TELEPHONE ENCOUNTER
----- Message from Shanel Aguilar MA sent at 11/27/2017  2:21 PM EST -----  Pt needs a refill on her vyvanse 50 mg.    Pt: [phone]

## 2017-11-27 NOTE — TELEPHONE ENCOUNTER
Called patient to notify that script is at  to be picked up 8am-4pm Monday-Friday.  Patient verbalized understanding.

## 2018-01-02 ENCOUNTER — TELEPHONE (OUTPATIENT)
Dept: INTERNAL MEDICINE | Facility: CLINIC | Age: 23
End: 2018-01-02

## 2018-01-02 DIAGNOSIS — R41.840 ATTENTION AND CONCENTRATION DEFICIT: ICD-10-CM

## 2018-01-02 NOTE — TELEPHONE ENCOUNTER
----- Message from Diana Hoover sent at 1/2/2018 12:51 PM EST -----  Patient called in regards to requesting a prescription refill.    RX needed: Vyvanse 50 MG     [facility]     Call back number for patient: [phone]    Thank you.

## 2018-01-02 NOTE — TELEPHONE ENCOUNTER
Tried calling patient.  Mail box full   Unable to leave message     Rx placed in front office for

## 2018-02-08 ENCOUNTER — OFFICE VISIT (OUTPATIENT)
Dept: INTERNAL MEDICINE | Facility: CLINIC | Age: 23
End: 2018-02-08

## 2018-02-08 VITALS
DIASTOLIC BLOOD PRESSURE: 68 MMHG | WEIGHT: 127 LBS | HEART RATE: 83 BPM | HEIGHT: 65 IN | BODY MASS INDEX: 21.16 KG/M2 | OXYGEN SATURATION: 97 % | RESPIRATION RATE: 16 BRPM | SYSTOLIC BLOOD PRESSURE: 112 MMHG | TEMPERATURE: 99.2 F

## 2018-02-08 DIAGNOSIS — R41.840 ATTENTION AND CONCENTRATION DEFICIT: Primary | ICD-10-CM

## 2018-02-08 DIAGNOSIS — R06.2 WHEEZING: ICD-10-CM

## 2018-02-08 PROCEDURE — 99213 OFFICE O/P EST LOW 20 MIN: CPT | Performed by: INTERNAL MEDICINE

## 2018-02-08 RX ORDER — ALBUTEROL SULFATE 90 UG/1
2 AEROSOL, METERED RESPIRATORY (INHALATION) EVERY 4 HOURS PRN
Qty: 1 INHALER | Refills: 2 | Status: SHIPPED | OUTPATIENT
Start: 2018-02-08 | End: 2020-11-09 | Stop reason: SDUPTHER

## 2018-02-08 NOTE — PROGRESS NOTES
Subjective       Loyda Richmond is a 22 y.o. female.     Chief Complaint   Patient presents with   • ADD       History obtained from the patient.      History of Present Illness     ADHD (Follow-Up): The patient's ADHD subtype is the predominantly inattentive type. Her ADHD has been well controlled since the last visit.   Interval Events: Her Vyvanse is now $250.  She has been out for one week.  Target Symptoms: one week.  1. Hyperactive behavior is denied.   2. Impulsive behavior is denied.   3. Difficulty concentrating is denied.   Medications: Vyvanse daily. The patient takes her medications all of the time.   Medication side effects include no anorexia, no headache, no tics, no irritability, no problems sleeping, no weight loss, no abdominal pain, no nausea and no suicidal ideation    Current Outpatient Prescriptions on File Prior to Visit   Medication Sig Dispense Refill   • clindamycin-benzoyl peroxide (BENZACLIN) 1-5 % gel Apply  topically 2 (two) times a day. 50 g 0   • Etonogestrel (NEXPLANON SC) Inject  under the skin.     • fluticasone (FLONASE) 50 MCG/ACT nasal spray into each nostril.     • ibuprofen (ADVIL,MOTRIN) 200 MG tablet Take 200 mg by mouth Every 6 (Six) Hours As Needed for Mild Pain (1-3).     • levocetirizine (XYZAL) 5 MG tablet Take 1 tablet by mouth Every Evening. 30 tablet 0   • lisdexamfetamine (VYVANSE) 50 MG capsule Take 1 capsule by mouth Daily 30 capsule 0     No current facility-administered medications on file prior to visit.          The following portions of the patient's history were reviewed and updated as appropriate: allergies, current medications, past family history, past medical history, past social history, past surgical history and problem list.    Review of Systems   Constitutional: Negative for appetite change, fatigue and unexpected weight change.   Respiratory: Positive for wheezing (at night for several weeks, improved with 2 puffs of her father's inhaler.). Negative  "for shortness of breath.    Cardiovascular: Negative for chest pain and palpitations.   Gastrointestinal: Negative for abdominal pain, diarrhea, nausea and vomiting.   Neurological: Negative for headaches.        No tics.  No memory loss.   Psychiatric/Behavioral: Negative for agitation, confusion, decreased concentration, sleep disturbance and suicidal ideas. The patient is not nervous/anxious.         No depression.         Objective       Blood pressure 112/68, pulse 83, temperature 99.2 °F (37.3 °C), temperature source Temporal Artery , resp. rate 16, height 165.1 cm (65\"), weight 57.6 kg (127 lb), SpO2 97 %.      Physical Exam   Constitutional: She appears well-developed and well-nourished.   Cardiovascular: Normal rate, regular rhythm and normal heart sounds.    No murmur heard.  Pulmonary/Chest: Effort normal and breath sounds normal.   Neurological: She is alert.   Psychiatric: She has a normal mood and affect.   Nursing note and vitals reviewed.    Counseling was given to patient for the following topics: instructions for management, patient and family education, impressions, risks and benefits of treatment options and importance of treatment compliance . Total time of the encounter was 15 minutes and 12 minutes was spend counseling.    Assessment / Plan:    Loyda was seen today for add.    Diagnoses and all orders for this visit:    Attention and concentration deficit  -     lisdexamfetamine (VYVANSE) 50 MG capsule; Take 1 capsule by mouth Daily    Wheezing  -     albuterol (PROVENTIL HFA;VENTOLIN HFA) 108 (90 Base) MCG/ACT inhaler; Inhale 2 puffs Every 4 (Four) Hours As Needed for Wheezing.        The patient was instructed in the side effects of the medication.  Risks of the potential for tolerance, dependence, and addiction were discussed.  The patient was instructed to take the lowest dosage of the medication, at the lowest frequency, and for the shortest period of time possible.  The patient was " instructed not to receive controlled substances or narcotics from other doctors, and not to giveaway or sell the medication.      The patient was instructed to abstain from illicit drug use.  The patient was instructed to avoid alcohol while taking these medications.        Narcotics/controlled substance agreement, Charles report, and Urine Drug Screen were updated today if needed.      Return in about 3 months (around 5/8/2018) for Annual physical, fasting.

## 2018-03-08 ENCOUNTER — TELEPHONE (OUTPATIENT)
Dept: INTERNAL MEDICINE | Facility: CLINIC | Age: 23
End: 2018-03-08

## 2018-03-08 RX ORDER — CYCLOBENZAPRINE HCL 10 MG
TABLET ORAL
Qty: 30 TABLET | Refills: 0 | Status: SHIPPED | OUTPATIENT
Start: 2018-03-08 | End: 2018-03-21

## 2018-03-08 NOTE — TELEPHONE ENCOUNTER
Loyda notified     She would like the Rx sent to a pharmacy in [place]     She is going to call back with the name , address and phone # of the pharmacy and then we can sent it.      Verb understanding given

## 2018-03-08 NOTE — TELEPHONE ENCOUNTER
Ok to call in Cyclobenzaprine 10 mg, 1 po TID prn pain, # 30 no refill.  No alcohol when she takes it.  Also should not drive or operate heavy machinery if she takes it during the day.

## 2018-03-08 NOTE — TELEPHONE ENCOUNTER
----- Message from Doreen Dean sent at 3/7/2018  3:31 PM EST -----  Patient hurt back and is wanting to know if she can be RX muscle relaxer's since she's had them in the past.     Please call patient at: [phone]    Thank you.

## 2018-03-21 ENCOUNTER — OFFICE VISIT (OUTPATIENT)
Dept: INTERNAL MEDICINE | Facility: CLINIC | Age: 23
End: 2018-03-21

## 2018-03-21 VITALS
DIASTOLIC BLOOD PRESSURE: 84 MMHG | SYSTOLIC BLOOD PRESSURE: 110 MMHG | TEMPERATURE: 97.9 F | HEART RATE: 80 BPM | RESPIRATION RATE: 20 BRPM | BODY MASS INDEX: 21.7 KG/M2 | WEIGHT: 130.38 LBS

## 2018-03-21 DIAGNOSIS — M54.42 ACUTE LEFT-SIDED LOW BACK PAIN WITH BILATERAL SCIATICA: Primary | ICD-10-CM

## 2018-03-21 DIAGNOSIS — M54.41 ACUTE LEFT-SIDED LOW BACK PAIN WITH BILATERAL SCIATICA: Primary | ICD-10-CM

## 2018-03-21 PROCEDURE — 99214 OFFICE O/P EST MOD 30 MIN: CPT | Performed by: INTERNAL MEDICINE

## 2018-03-21 RX ORDER — HYDROCODONE BITARTRATE AND ACETAMINOPHEN 5; 325 MG/1; MG/1
1 TABLET ORAL NIGHTLY PRN
Qty: 10 TABLET | Refills: 0 | Status: SHIPPED | OUTPATIENT
Start: 2018-03-21 | End: 2018-06-05

## 2018-03-21 RX ORDER — METHYLPREDNISOLONE 4 MG/1
TABLET ORAL
Qty: 1 EACH | Refills: 0 | Status: SHIPPED | OUTPATIENT
Start: 2018-03-21 | End: 2018-06-05

## 2018-03-21 NOTE — PATIENT INSTRUCTIONS
Hold Ibuprofen while on the Voltaren.  Try taking 1/2 of the Cyclobenzaprine tablet.  If no better in 2 weeks, recommend PT.

## 2018-03-21 NOTE — PROGRESS NOTES
Subjective       Loyda Richmond is a 23 y.o. female.     Chief Complaint   Patient presents with   • Back Pain     since 3/6/2018        History obtained from the patient.      Back Pain   This is a new problem. Episode onset: 2 weeks ago, started hurting after a  workout at home. The problem occurs constantly. The problem has been waxing and waning (much worse last night) since onset. The pain is present in the lumbar spine (mid to left). The pain does not radiate (except once down the left leg). Pain severity now: severe last night. The pain is worse during the day. The symptoms are aggravated by coughing, sitting, standing, twisting and bending. Stiffness is present at night. Pertinent negatives include no abdominal pain, bladder incontinence, bowel incontinence, chest pain, dysuria, fever, headaches, leg pain, numbness, pelvic pain, tingling, weakness or weight loss. Risk factors include lack of exercise (and history of back pain x 8 years, PT in the past). She has tried NSAIDs, heat and ice (Cyclobenzaprine) for the symptoms. The treatment provided mild (but was fatigued and had sweating) relief.        The following portions of the patient's history were reviewed and updated as appropriate: allergies, current medications, past family history, past medical history, past social history, past surgical history and problem list.      Review of Systems   Constitutional: Negative for chills, fever, unexpected weight change and weight loss.   Respiratory: Negative for cough and shortness of breath.    Cardiovascular: Negative for chest pain.   Gastrointestinal: Negative for abdominal pain, blood in stool, bowel incontinence, constipation, diarrhea, nausea and vomiting.        Denies melena.     Genitourinary: Negative for bladder incontinence, dysuria, frequency, hematuria, pelvic pain, urgency, vaginal bleeding and vaginal discharge.   Musculoskeletal: Positive for back pain and gait problem (walk off due to pain).  Negative for arthralgias, joint swelling, myalgias, neck pain and neck stiffness.   Skin: Negative for rash.   Neurological: Negative for tingling, weakness, numbness and headaches.   Hematological: Negative for adenopathy.           Objective     Blood pressure 110/84, pulse 80, temperature 97.9 °F (36.6 °C), temperature source Temporal Artery , resp. rate 20, weight 59.1 kg (130 lb 6 oz).    Physical Exam   Constitutional: She appears well-developed and well-nourished.   Neck: Normal range of motion. Neck supple.   There is no tenderness to palpation of the cervical spine or paraspinal muscles.   Cardiovascular: Normal rate, regular rhythm and normal heart sounds.    No murmur heard.  Pulmonary/Chest: Effort normal and breath sounds normal.   Abdominal: Soft. Bowel sounds are normal. She exhibits no distension and no mass. There is no hepatosplenomegaly. There is no tenderness.   No CVA tenderness.   Musculoskeletal: Normal range of motion.   There is no tenderness to palpation over the lumbar or thoracic spine or paraspinal muscles.  Straight leg raise is positive on the right and negative on the left.  She has pain with the lowering of both legs.  There is no pain with right hip flexion, extension, abduction, and adduction.  There is no pain with left hip flexion, extension, abduction, and adduction.   Neurological: She has normal strength and normal reflexes.   Skin: No rash noted.   Psychiatric: She has a normal mood and affect.   Nursing note and vitals reviewed.        Assessment/Plan   Loyda was seen today for back pain.    Diagnoses and all orders for this visit:    Acute left-sided low back pain with bilateral sciatica  -     MethylPREDNISolone (MEDROL, JUSTIN,) 4 MG tablet; Take as directed on package instructions.  -     HYDROcodone-acetaminophen (NORCO) 5-325 MG per tablet; Take 1 tablet by mouth At Night As Needed for Moderate Pain  or Severe Pain .  -     diclofenac (VOLTAREN) 50 MG EC tablet; Take 1  tablet by mouth 2 (Two) Times a Day As Needed (pain).         Hold Ibuprofen while on the Voltaren.  Try taking 1/2 of the Cyclobenzaprine tablet.  If no better in 2 weeks, recommend PT.      Return if symptoms worsen or fail to improve.

## 2018-04-11 ENCOUNTER — TELEPHONE (OUTPATIENT)
Dept: INTERNAL MEDICINE | Facility: CLINIC | Age: 23
End: 2018-04-11

## 2018-04-11 DIAGNOSIS — R41.840 ATTENTION AND CONCENTRATION DEFICIT: ICD-10-CM

## 2018-04-11 NOTE — TELEPHONE ENCOUNTER
----- Message from Doreen Dean sent at 4/11/2018  9:39 AM EDT -----  PATIENT IS NEEDING A REFILL ON lisdexamfetamine (VYVANSE) 50 MG capsule. ALSO, PATIENT HAD THE NEXPLANON REMOVED AND IS NEEDING KARIVA REFILL    PHARMACY: [address] [phone] [facility] [phone] [facility]    PATIENT CALL BACK : [phone]    THANK YOU

## 2018-05-16 ENCOUNTER — TELEPHONE (OUTPATIENT)
Dept: INTERNAL MEDICINE | Facility: CLINIC | Age: 23
End: 2018-05-16

## 2018-05-16 DIAGNOSIS — R41.840 ATTENTION AND CONCENTRATION DEFICIT: ICD-10-CM

## 2018-05-16 NOTE — TELEPHONE ENCOUNTER
----- Message from Khushbu Soto sent at 5/16/2018  2:47 PM EDT -----  PATIENT IS NEEDING A REFILL ON lisdexamfetamine (VYVANSE) 50 MG capsule

## 2018-05-22 ENCOUNTER — TELEPHONE (OUTPATIENT)
Dept: INTERNAL MEDICINE | Facility: CLINIC | Age: 23
End: 2018-05-22

## 2018-05-22 DIAGNOSIS — N76.0 ACUTE VAGINITIS: Primary | ICD-10-CM

## 2018-05-22 RX ORDER — FLUCONAZOLE 150 MG/1
150 TABLET ORAL ONCE
Qty: 2 TABLET | Refills: 0 | Status: SHIPPED | OUTPATIENT
Start: 2018-05-22 | End: 2018-05-22

## 2018-05-22 NOTE — TELEPHONE ENCOUNTER
----- Message from Gardenia Roth sent at 5/22/2018  8:02 AM EDT -----  Pt [phone]  Pt has a yeast infection and would like something called in to [facility] [address]

## 2018-06-05 ENCOUNTER — OFFICE VISIT (OUTPATIENT)
Dept: INTERNAL MEDICINE | Facility: CLINIC | Age: 23
End: 2018-06-05

## 2018-06-05 VITALS
TEMPERATURE: 98.4 F | HEIGHT: 65 IN | WEIGHT: 126 LBS | DIASTOLIC BLOOD PRESSURE: 72 MMHG | SYSTOLIC BLOOD PRESSURE: 104 MMHG | RESPIRATION RATE: 18 BRPM | BODY MASS INDEX: 20.99 KG/M2 | HEART RATE: 84 BPM

## 2018-06-05 DIAGNOSIS — Z01.419 ENCOUNTER FOR CERVICAL PAP SMEAR WITH PELVIC EXAM: ICD-10-CM

## 2018-06-05 DIAGNOSIS — Z13.6 SCREENING FOR CARDIOVASCULAR CONDITION: ICD-10-CM

## 2018-06-05 DIAGNOSIS — J01.80 OTHER ACUTE SINUSITIS, RECURRENCE NOT SPECIFIED: ICD-10-CM

## 2018-06-05 DIAGNOSIS — Z79.899 HIGH RISK MEDICATION USE: ICD-10-CM

## 2018-06-05 DIAGNOSIS — R41.840 ATTENTION OR CONCENTRATION DEFICIT: ICD-10-CM

## 2018-06-05 DIAGNOSIS — Z00.00 ENCOUNTER FOR HEALTH MAINTENANCE EXAMINATION IN ADULT: Primary | ICD-10-CM

## 2018-06-05 LAB
25(OH)D3 SERPL-MCNC: 35.7 NG/ML
ALBUMIN SERPL-MCNC: 4.43 G/DL (ref 3.2–4.8)
ALBUMIN/GLOB SERPL: 1.6 G/DL (ref 1.5–2.5)
ALP SERPL-CCNC: 58 U/L (ref 25–100)
ALT SERPL W P-5'-P-CCNC: 19 U/L (ref 7–40)
ANION GAP SERPL CALCULATED.3IONS-SCNC: 3 MMOL/L (ref 3–11)
AST SERPL-CCNC: 25 U/L (ref 0–33)
BASOPHILS # BLD AUTO: 0.03 10*3/MM3 (ref 0–0.2)
BASOPHILS NFR BLD AUTO: 0.5 % (ref 0–1)
BILIRUB SERPL-MCNC: 0.5 MG/DL (ref 0.3–1.2)
BUN BLD-MCNC: 9 MG/DL (ref 9–23)
BUN/CREAT SERPL: 10.8 (ref 7–25)
CALCIUM SPEC-SCNC: 9.5 MG/DL (ref 8.7–10.4)
CHLORIDE SERPL-SCNC: 106 MMOL/L (ref 99–109)
CO2 SERPL-SCNC: 32 MMOL/L (ref 20–31)
CREAT BLD-MCNC: 0.83 MG/DL (ref 0.6–1.3)
DEPRECATED RDW RBC AUTO: 39.1 FL (ref 37–54)
EOSINOPHIL # BLD AUTO: 0.25 10*3/MM3 (ref 0–0.3)
EOSINOPHIL NFR BLD AUTO: 3.9 % (ref 0–3)
ERYTHROCYTE [DISTWIDTH] IN BLOOD BY AUTOMATED COUNT: 12 % (ref 11.3–14.5)
GFR SERPL CREATININE-BSD FRML MDRD: 84 ML/MIN/1.73
GLOBULIN UR ELPH-MCNC: 2.8 GM/DL
GLUCOSE BLD-MCNC: 94 MG/DL (ref 70–100)
HCT VFR BLD AUTO: 44.9 % (ref 34.5–44)
HGB BLD-MCNC: 15.1 G/DL (ref 11.5–15.5)
IMM GRANULOCYTES # BLD: 0.03 10*3/MM3 (ref 0–0.03)
IMM GRANULOCYTES NFR BLD: 0.5 % (ref 0–0.6)
LYMPHOCYTES # BLD AUTO: 1.7 10*3/MM3 (ref 0.6–4.8)
LYMPHOCYTES NFR BLD AUTO: 26.9 % (ref 24–44)
MCH RBC QN AUTO: 30.3 PG (ref 27–31)
MCHC RBC AUTO-ENTMCNC: 33.6 G/DL (ref 32–36)
MCV RBC AUTO: 90.2 FL (ref 80–99)
MONOCYTES # BLD AUTO: 0.59 10*3/MM3 (ref 0–1)
MONOCYTES NFR BLD AUTO: 9.3 % (ref 0–12)
NEUTROPHILS # BLD AUTO: 3.76 10*3/MM3 (ref 1.5–8.3)
NEUTROPHILS NFR BLD AUTO: 59.4 % (ref 41–71)
PLATELET # BLD AUTO: 325 10*3/MM3 (ref 150–450)
PMV BLD AUTO: 10 FL (ref 6–12)
POTASSIUM BLD-SCNC: 4.2 MMOL/L (ref 3.5–5.5)
PROT SERPL-MCNC: 7.2 G/DL (ref 5.7–8.2)
RBC # BLD AUTO: 4.98 10*6/MM3 (ref 3.89–5.14)
SODIUM BLD-SCNC: 141 MMOL/L (ref 132–146)
TSH SERPL DL<=0.05 MIU/L-ACNC: 0.69 MIU/ML (ref 0.35–5.35)
WBC NRBC COR # BLD: 6.33 10*3/MM3 (ref 3.5–10.8)

## 2018-06-05 PROCEDURE — 36415 COLL VENOUS BLD VENIPUNCTURE: CPT | Performed by: INTERNAL MEDICINE

## 2018-06-05 PROCEDURE — 80053 COMPREHEN METABOLIC PANEL: CPT | Performed by: INTERNAL MEDICINE

## 2018-06-05 PROCEDURE — 99395 PREV VISIT EST AGE 18-39: CPT | Performed by: INTERNAL MEDICINE

## 2018-06-05 PROCEDURE — 84443 ASSAY THYROID STIM HORMONE: CPT | Performed by: INTERNAL MEDICINE

## 2018-06-05 PROCEDURE — 85025 COMPLETE CBC W/AUTO DIFF WBC: CPT | Performed by: INTERNAL MEDICINE

## 2018-06-05 PROCEDURE — 99212 OFFICE O/P EST SF 10 MIN: CPT | Performed by: INTERNAL MEDICINE

## 2018-06-05 PROCEDURE — 82306 VITAMIN D 25 HYDROXY: CPT | Performed by: INTERNAL MEDICINE

## 2018-06-05 RX ORDER — CEFDINIR 300 MG/1
300 CAPSULE ORAL 2 TIMES DAILY
Qty: 20 CAPSULE | Refills: 0 | Status: SHIPPED | OUTPATIENT
Start: 2018-06-05 | End: 2018-06-15

## 2018-06-05 RX ORDER — CETIRIZINE HYDROCHLORIDE 10 MG/1
10 TABLET ORAL DAILY
COMMUNITY
End: 2020-11-11 | Stop reason: ALTCHOICE

## 2018-06-05 NOTE — PATIENT INSTRUCTIONS
The patient agrees to check with her insurance company to see what other ADD medications are covered.      For the sinus infection, recommend continue Mucinex.  Add Allegra and / or Flonase.        Health Maintenance, Female  Adopting a healthy lifestyle and getting preventive care can go a long way to promote health and wellness. Talk with your health care provider about what schedule of regular examinations is right for you. This is a good chance for you to check in with your provider about disease prevention and staying healthy.  In between checkups, there are plenty of things you can do on your own. Experts have done a lot of research about which lifestyle changes and preventive measures are most likely to keep you healthy. Ask your health care provider for more information.  Weight and diet  Eat a healthy diet  · Be sure to include plenty of vegetables, fruits, low-fat dairy products, and lean protein.  · Do not eat a lot of foods high in solid fats, added sugars, or salt.  · Get regular exercise. This is one of the most important things you can do for your health.  ¨ Most adults should exercise for at least 150 minutes each week. The exercise should increase your heart rate and make you sweat (moderate-intensity exercise).  ¨ Most adults should also do strengthening exercises at least twice a week. This is in addition to the moderate-intensity exercise.  Maintain a healthy weight  · Body mass index (BMI) is a measurement that can be used to identify possible weight problems. It estimates body fat based on height and weight. Your health care provider can help determine your BMI and help you achieve or maintain a healthy weight.  · For females 20 years of age and older:  ¨ A BMI below 18.5 is considered underweight.  ¨ A BMI of 18.5 to 24.9 is normal.  ¨ A BMI of 25 to 29.9 is considered overweight.  ¨ A BMI of 30 and above is considered obese.  Watch levels of cholesterol and blood lipids  · You should start  having your blood tested for lipids and cholesterol at 20 years of age, then have this test every 5 years.  · You may need to have your cholesterol levels checked more often if:  ¨ Your lipid or cholesterol levels are high.  ¨ You are older than 50 years of age.  ¨ You are at high risk for heart disease.  Cancer screening  Lung Cancer  · Lung cancer screening is recommended for adults 55-80 years old who are at high risk for lung cancer because of a history of smoking.  · A yearly low-dose CT scan of the lungs is recommended for people who:  ¨ Currently smoke.  ¨ Have quit within the past 15 years.  ¨ Have at least a 30-pack-year history of smoking. A pack year is smoking an average of one pack of cigarettes a day for 1 year.  · Yearly screening should continue until it has been 15 years since you quit.  · Yearly screening should stop if you develop a health problem that would prevent you from having lung cancer treatment.  Breast Cancer  · Practice breast self-awareness. This means understanding how your breasts normally appear and feel.  · It also means doing regular breast self-exams. Let your health care provider know about any changes, no matter how small.  · If you are in your 20s or 30s, you should have a clinical breast exam (CBE) by a health care provider every 1-3 years as part of a regular health exam.  · If you are 40 or older, have a CBE every year. Also consider having a breast X-ray (mammogram) every year.  · If you have a family history of breast cancer, talk to your health care provider about genetic screening.  · If you are at high risk for breast cancer, talk to your health care provider about having an MRI and a mammogram every year.  · Breast cancer gene (BRCA) assessment is recommended for women who have family members with BRCA-related cancers. BRCA-related cancers include:  ¨ Breast.  ¨ Ovarian.  ¨ Tubal.  ¨ Peritoneal cancers.  · Results of the assessment will determine the need for genetic  counseling and BRCA1 and BRCA2 testing.  Cervical Cancer   Your health care provider may recommend that you be screened regularly for cancer of the pelvic organs (ovaries, uterus, and vagina). This screening involves a pelvic examination, including checking for microscopic changes to the surface of your cervix (Pap test). You may be encouraged to have this screening done every 3 years, beginning at age 21.  · For women ages 30-65, health care providers may recommend pelvic exams and Pap testing every 3 years, or they may recommend the Pap and pelvic exam, combined with testing for human papilloma virus (HPV), every 5 years. Some types of HPV increase your risk of cervical cancer. Testing for HPV may also be done on women of any age with unclear Pap test results.  · Other health care providers may not recommend any screening for nonpregnant women who are considered low risk for pelvic cancer and who do not have symptoms. Ask your health care provider if a screening pelvic exam is right for you.  · If you have had past treatment for cervical cancer or a condition that could lead to cancer, you need Pap tests and screening for cancer for at least 20 years after your treatment. If Pap tests have been discontinued, your risk factors (such as having a new sexual partner) need to be reassessed to determine if screening should resume. Some women have medical problems that increase the chance of getting cervical cancer. In these cases, your health care provider may recommend more frequent screening and Pap tests.  Colorectal Cancer  · This type of cancer can be detected and often prevented.  · Routine colorectal cancer screening usually begins at 50 years of age and continues through 75 years of age.  · Your health care provider may recommend screening at an earlier age if you have risk factors for colon cancer.  · Your health care provider may also recommend using home test kits to check for hidden blood in the stool.  · A  small camera at the end of a tube can be used to examine your colon directly (sigmoidoscopy or colonoscopy). This is done to check for the earliest forms of colorectal cancer.  · Routine screening usually begins at age 50.  · Direct examination of the colon should be repeated every 5-10 years through 75 years of age. However, you may need to be screened more often if early forms of precancerous polyps or small growths are found.  Skin Cancer  · Check your skin from head to toe regularly.  · Tell your health care provider about any new moles or changes in moles, especially if there is a change in a mole's shape or color.  · Also tell your health care provider if you have a mole that is larger than the size of a pencil eraser.  · Always use sunscreen. Apply sunscreen liberally and repeatedly throughout the day.  · Protect yourself by wearing long sleeves, pants, a wide-brimmed hat, and sunglasses whenever you are outside.  Heart disease, diabetes, and high blood pressure  · High blood pressure causes heart disease and increases the risk of stroke. High blood pressure is more likely to develop in:  ¨ People who have blood pressure in the high end of the normal range (130-139/85-89 mm Hg).  ¨ People who are overweight or obese.  ¨ People who are .  · If you are 18-39 years of age, have your blood pressure checked every 3-5 years. If you are 40 years of age or older, have your blood pressure checked every year. You should have your blood pressure measured twice--once when you are at a hospital or clinic, and once when you are not at a hospital or clinic. Record the average of the two measurements. To check your blood pressure when you are not at a hospital or clinic, you can use:  ¨ An automated blood pressure machine at a pharmacy.  ¨ A home blood pressure monitor.  · If you are between 55 years and 79 years old, ask your health care provider if you should take aspirin to prevent strokes.  · Have regular  diabetes screenings. This involves taking a blood sample to check your fasting blood sugar level.  ¨ If you are at a normal weight and have a low risk for diabetes, have this test once every three years after 45 years of age.  ¨ If you are overweight and have a high risk for diabetes, consider being tested at a younger age or more often.  Preventing infection  Hepatitis B  · If you have a higher risk for hepatitis B, you should be screened for this virus. You are considered at high risk for hepatitis B if:  ¨ You were born in a country where hepatitis B is common. Ask your health care provider which countries are considered high risk.  ¨ Your parents were born in a high-risk country, and you have not been immunized against hepatitis B (hepatitis B vaccine).  ¨ You have HIV or AIDS.  ¨ You use needles to inject street drugs.  ¨ You live with someone who has hepatitis B.  ¨ You have had sex with someone who has hepatitis B.  ¨ You get hemodialysis treatment.  ¨ You take certain medicines for conditions, including cancer, organ transplantation, and autoimmune conditions.  Hepatitis C  · Blood testing is recommended for:  ¨ Everyone born from 1945 through 1965.  ¨ Anyone with known risk factors for hepatitis C.  Sexually transmitted infections (STIs)  · You should be screened for sexually transmitted infections (STIs) including gonorrhea and chlamydia if:  ¨ You are sexually active and are younger than 24 years of age.  ¨ You are older than 24 years of age and your health care provider tells you that you are at risk for this type of infection.  ¨ Your sexual activity has changed since you were last screened and you are at an increased risk for chlamydia or gonorrhea. Ask your health care provider if you are at risk.  · If you do not have HIV, but are at risk, it may be recommended that you take a prescription medicine daily to prevent HIV infection. This is called pre-exposure prophylaxis (PrEP). You are considered at  risk if:  ¨ You are sexually active and do not regularly use condoms or know the HIV status of your partner(s).  ¨ You take drugs by injection.  ¨ You are sexually active with a partner who has HIV.  Talk with your health care provider about whether you are at high risk of being infected with HIV. If you choose to begin PrEP, you should first be tested for HIV. You should then be tested every 3 months for as long as you are taking PrEP.  Pregnancy  · If you are premenopausal and you may become pregnant, ask your health care provider about preconception counseling.  · If you may become pregnant, take 400 to 800 micrograms (mcg) of folic acid every day.  · If you want to prevent pregnancy, talk to your health care provider about birth control (contraception).  Osteoporosis and menopause  · Osteoporosis is a disease in which the bones lose minerals and strength with aging. This can result in serious bone fractures. Your risk for osteoporosis can be identified using a bone density scan.  · If you are 65 years of age or older, or if you are at risk for osteoporosis and fractures, ask your health care provider if you should be screened.  · Ask your health care provider whether you should take a calcium or vitamin D supplement to lower your risk for osteoporosis.  · Menopause may have certain physical symptoms and risks.  · Hormone replacement therapy may reduce some of these symptoms and risks.  Talk to your health care provider about whether hormone replacement therapy is right for you.  Follow these instructions at home:  · Schedule regular health, dental, and eye exams.  · Stay current with your immunizations.  · Do not use any tobacco products including cigarettes, chewing tobacco, or electronic cigarettes.  · If you are pregnant, do not drink alcohol.  · If you are breastfeeding, limit how much and how often you drink alcohol.  · Limit alcohol intake to no more than 1 drink per day for nonpregnant women. One drink  equals 12 ounces of beer, 5 ounces of wine, or 1½ ounces of hard liquor.  · Do not use street drugs.  · Do not share needles.  · Ask your health care provider for help if you need support or information about quitting drugs.  · Tell your health care provider if you often feel depressed.  · Tell your health care provider if you have ever been abused or do not feel safe at home.  This information is not intended to replace advice given to you by your health care provider. Make sure you discuss any questions you have with your health care provider.  Document Released: 07/02/2012 Document Revised: 05/25/2017 Document Reviewed: 09/20/2016  [employer] Patient Education © 2017 [employer]  Health Maintenance, Female  Adopting a healthy lifestyle and getting preventive care can go a long way to promote health and wellness. Talk with your health care provider about what schedule of regular examinations is right for you. This is a good chance for you to check in with your provider about disease prevention and staying healthy.  In between checkups, there are plenty of things you can do on your own. Experts have done a lot of research about which lifestyle changes and preventive measures are most likely to keep you healthy. Ask your health care provider for more information.  Weight and diet  Eat a healthy diet  · Be sure to include plenty of vegetables, fruits, low-fat dairy products, and lean protein.  · Do not eat a lot of foods high in solid fats, added sugars, or salt.  · Get regular exercise. This is one of the most important things you can do for your health.  ¨ Most adults should exercise for at least 150 minutes each week. The exercise should increase your heart rate and make you sweat (moderate-intensity exercise).  ¨ Most adults should also do strengthening exercises at least twice a week. This is in addition to the moderate-intensity exercise.  Maintain a healthy weight  · Body mass index (BMI) is a  measurement that can be used to identify possible weight problems. It estimates body fat based on height and weight. Your health care provider can help determine your BMI and help you achieve or maintain a healthy weight.  · For females 20 years of age and older:  ¨ A BMI below 18.5 is considered underweight.  ¨ A BMI of 18.5 to 24.9 is normal.  ¨ A BMI of 25 to 29.9 is considered overweight.  ¨ A BMI of 30 and above is considered obese.  Watch levels of cholesterol and blood lipids  · You should start having your blood tested for lipids and cholesterol at 20 years of age, then have this test every 5 years.  · You may need to have your cholesterol levels checked more often if:  ¨ Your lipid or cholesterol levels are high.  ¨ You are older than 50 years of age.  ¨ You are at high risk for heart disease.  Cancer screening  Lung Cancer  · Lung cancer screening is recommended for adults 55-80 years old who are at high risk for lung cancer because of a history of smoking.  · A yearly low-dose CT scan of the lungs is recommended for people who:  ¨ Currently smoke.  ¨ Have quit within the past 15 years.  ¨ Have at least a 30-pack-year history of smoking. A pack year is smoking an average of one pack of cigarettes a day for 1 year.  · Yearly screening should continue until it has been 15 years since you quit.  · Yearly screening should stop if you develop a health problem that would prevent you from having lung cancer treatment.  Breast Cancer  · Practice breast self-awareness. This means understanding how your breasts normally appear and feel.  · It also means doing regular breast self-exams. Let your health care provider know about any changes, no matter how small.  · If you are in your 20s or 30s, you should have a clinical breast exam (CBE) by a health care provider every 1-3 years as part of a regular health exam.  · If you are 40 or older, have a CBE every year. Also consider having a breast X-ray (mammogram) every  year.  · If you have a family history of breast cancer, talk to your health care provider about genetic screening.  · If you are at high risk for breast cancer, talk to your health care provider about having an MRI and a mammogram every year.  · Breast cancer gene (BRCA) assessment is recommended for women who have family members with BRCA-related cancers. BRCA-related cancers include:  ¨ Breast.  ¨ Ovarian.  ¨ Tubal.  ¨ Peritoneal cancers.  · Results of the assessment will determine the need for genetic counseling and BRCA1 and BRCA2 testing.  Cervical Cancer   Your health care provider may recommend that you be screened regularly for cancer of the pelvic organs (ovaries, uterus, and vagina). This screening involves a pelvic examination, including checking for microscopic changes to the surface of your cervix (Pap test). You may be encouraged to have this screening done every 3 years, beginning at age 21.  · For women ages 30-65, health care providers may recommend pelvic exams and Pap testing every 3 years, or they may recommend the Pap and pelvic exam, combined with testing for human papilloma virus (HPV), every 5 years. Some types of HPV increase your risk of cervical cancer. Testing for HPV may also be done on women of any age with unclear Pap test results.  · Other health care providers may not recommend any screening for nonpregnant women who are considered low risk for pelvic cancer and who do not have symptoms. Ask your health care provider if a screening pelvic exam is right for you.  · If you have had past treatment for cervical cancer or a condition that could lead to cancer, you need Pap tests and screening for cancer for at least 20 years after your treatment. If Pap tests have been discontinued, your risk factors (such as having a new sexual partner) need to be reassessed to determine if screening should resume. Some women have medical problems that increase the chance of getting cervical cancer. In  these cases, your health care provider may recommend more frequent screening and Pap tests.  Colorectal Cancer  · This type of cancer can be detected and often prevented.  · Routine colorectal cancer screening usually begins at 50 years of age and continues through 75 years of age.  · Your health care provider may recommend screening at an earlier age if you have risk factors for colon cancer.  · Your health care provider may also recommend using home test kits to check for hidden blood in the stool.  · A small camera at the end of a tube can be used to examine your colon directly (sigmoidoscopy or colonoscopy). This is done to check for the earliest forms of colorectal cancer.  · Routine screening usually begins at age 50.  · Direct examination of the colon should be repeated every 5-10 years through 75 years of age. However, you may need to be screened more often if early forms of precancerous polyps or small growths are found.  Skin Cancer  · Check your skin from head to toe regularly.  · Tell your health care provider about any new moles or changes in moles, especially if there is a change in a mole's shape or color.  · Also tell your health care provider if you have a mole that is larger than the size of a pencil eraser.  · Always use sunscreen. Apply sunscreen liberally and repeatedly throughout the day.  · Protect yourself by wearing long sleeves, pants, a wide-brimmed hat, and sunglasses whenever you are outside.  Heart disease, diabetes, and high blood pressure  · High blood pressure causes heart disease and increases the risk of stroke. High blood pressure is more likely to develop in:  ¨ People who have blood pressure in the high end of the normal range (130-139/85-89 mm Hg).  ¨ People who are overweight or obese.  ¨ People who are .  · If you are 18-39 years of age, have your blood pressure checked every 3-5 years. If you are 40 years of age or older, have your blood pressure checked  every year. You should have your blood pressure measured twice--once when you are at a hospital or clinic, and once when you are not at a hospital or clinic. Record the average of the two measurements. To check your blood pressure when you are not at a hospital or clinic, you can use:  ¨ An automated blood pressure machine at a pharmacy.  ¨ A home blood pressure monitor.  · If you are between 55 years and 79 years old, ask your health care provider if you should take aspirin to prevent strokes.  · Have regular diabetes screenings. This involves taking a blood sample to check your fasting blood sugar level.  ¨ If you are at a normal weight and have a low risk for diabetes, have this test once every three years after 45 years of age.  ¨ If you are overweight and have a high risk for diabetes, consider being tested at a younger age or more often.  Preventing infection  Hepatitis B  · If you have a higher risk for hepatitis B, you should be screened for this virus. You are considered at high risk for hepatitis B if:  ¨ You were born in a country where hepatitis B is common. Ask your health care provider which countries are considered high risk.  ¨ Your parents were born in a high-risk country, and you have not been immunized against hepatitis B (hepatitis B vaccine).  ¨ You have HIV or AIDS.  ¨ You use needles to inject street drugs.  ¨ You live with someone who has hepatitis B.  ¨ You have had sex with someone who has hepatitis B.  ¨ You get hemodialysis treatment.  ¨ You take certain medicines for conditions, including cancer, organ transplantation, and autoimmune conditions.  Hepatitis C  · Blood testing is recommended for:  ¨ Everyone born from 1945 through 1965.  ¨ Anyone with known risk factors for hepatitis C.  Sexually transmitted infections (STIs)  · You should be screened for sexually transmitted infections (STIs) including gonorrhea and chlamydia if:  ¨ You are sexually active and are younger than 24 years of  age.  ¨ You are older than 24 years of age and your health care provider tells you that you are at risk for this type of infection.  ¨ Your sexual activity has changed since you were last screened and you are at an increased risk for chlamydia or gonorrhea. Ask your health care provider if you are at risk.  · If you do not have HIV, but are at risk, it may be recommended that you take a prescription medicine daily to prevent HIV infection. This is called pre-exposure prophylaxis (PrEP). You are considered at risk if:  ¨ You are sexually active and do not regularly use condoms or know the HIV status of your partner(s).  ¨ You take drugs by injection.  ¨ You are sexually active with a partner who has HIV.  Talk with your health care provider about whether you are at high risk of being infected with HIV. If you choose to begin PrEP, you should first be tested for HIV. You should then be tested every 3 months for as long as you are taking PrEP.  Pregnancy  · If you are premenopausal and you may become pregnant, ask your health care provider about preconception counseling.  · If you may become pregnant, take 400 to 800 micrograms (mcg) of folic acid every day.  · If you want to prevent pregnancy, talk to your health care provider about birth control (contraception).  Osteoporosis and menopause  · Osteoporosis is a disease in which the bones lose minerals and strength with aging. This can result in serious bone fractures. Your risk for osteoporosis can be identified using a bone density scan.  · If you are 65 years of age or older, or if you are at risk for osteoporosis and fractures, ask your health care provider if you should be screened.  · Ask your health care provider whether you should take a calcium or vitamin D supplement to lower your risk for osteoporosis.  · Menopause may have certain physical symptoms and risks.  · Hormone replacement therapy may reduce some of these symptoms and risks.  Talk to your health  care provider about whether hormone replacement therapy is right for you.  Follow these instructions at home:  · Schedule regular health, dental, and eye exams.  · Stay current with your immunizations.  · Do not use any tobacco products including cigarettes, chewing tobacco, or electronic cigarettes.  · If you are pregnant, do not drink alcohol.  · If you are breastfeeding, limit how much and how often you drink alcohol.  · Limit alcohol intake to no more than 1 drink per day for nonpregnant women. One drink equals 12 ounces of beer, 5 ounces of wine, or 1½ ounces of hard liquor.  · Do not use street drugs.  · Do not share needles.  · Ask your health care provider for help if you need support or information about quitting drugs.  · Tell your health care provider if you often feel depressed.  · Tell your health care provider if you have ever been abused or do not feel safe at home.  This information is not intended to replace advice given to you by your health care provider. Make sure you discuss any questions you have with your health care provider.  Document Released: 07/02/2012 Document Revised: 05/25/2017 Document Reviewed: 09/20/2016  [employer] Interactive Patient Education © 2017 [employer]    Safe Sex  Practicing safe sex means taking steps before and during sex to reduce your risk of:  · Getting an STD (sexually transmitted disease).  · Giving your partner an STD.  · Unwanted pregnancy.  How can I practice safe sex?     To practice safe sex:  · Limit your sexual partners to only one partner who is having sex with only you.  · Avoid using alcohol and recreational drugs before having sex. These substances can affect your judgment.  · Before having sex with a new partner:  ¨ Talk to your partner about past partners, past STDs, and drug use.  ¨ You and your partner should be screened for STDs and discuss the results with each other.  · Check your body regularly for sores, blisters, rashes, or unusual discharge.  If you notice any of these problems, visit your health care provider.  · If you have symptoms of an infection or you are being treated for an STD, avoid sexual contact.  · While having sex, use a condom. Make sure to:  ¨ Use a condom every time you have vaginal, oral, or anal sex. Both females and males should wear condoms during oral sex.  ¨ Keep condoms in place from the beginning to the end of sexual activity.  ¨ Use a latex condom, if possible. Latex condoms offer the best protection.  ¨ Use only water-based lubricants or oils to lubricate a condom. Using petroleum-based lubricants or oils will weaken the condom and increase the chance that it will break.  · See your health care provider for regular screenings, exams, and tests for STDs.  · Talk with your health care provider about the form of birth control (contraception) that is best for you.  · Get vaccinated against hepatitis B and human papillomavirus (HPV).  · If you are at risk of being infected with HIV (human immunodeficiency virus), talk with your health care provider about taking a prescription medicine to prevent HIV infection. You are considered at risk for HIV if:  ¨ You are a man who has sex with other men.  ¨ You are a heterosexual man or woman who is sexually active with more than one partner.  ¨ You take drugs by injection.  ¨ You are sexually active with a partner who has HIV.  This information is not intended to replace advice given to you by your health care provider. Make sure you discuss any questions you have with your health care provider.  Document Released: 01/25/2006 Document Revised: 05/03/2017 Document Reviewed: 11/06/2016  [employer] Interactive Patient Education © 2017 [employer]

## 2018-06-05 NOTE — PROGRESS NOTES
"          Subjective     Chief Complaint:  Physical Exam.    History of Present Illness    History obtained from the patient.    URI Acute Visit: The patient is here with a ten-day history of URI symptoms.    Symptoms: Nasal congestion, postnasal drainage, rhinorrhea (yellow initially, now clear), and a hacking cough productive of dark yellow sputum.  There is no chest pain or shortness of breath, but she is wheezing.  She denies hemoptysis.  She is fatigued.  She reports headache and sore throat, but no earache.  No fever or chills.  No myalgias or arthralgias.  Symptoms have been waxing and waning, but are worsening overall.    Treatments tried: Mucinex DM, NyQuil, Robitussin.  This has provided mild relief.    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 Her ADHD has been well controlled since the last visit.   Interval Events: She feels like she \"is dependent\" on the Vyvanse.  It works well for her, but if she does not take it she has no motivation for even simple tasks.  It is also extremely expensive.  The only other medication she has tried in the past is Adderall, she thinks.  Target Symptoms:   1. Hyperactive behavior is denied.   2. Impulsive behavior is denied.   3. Difficulty concentrating is denied.   Medications: Vyvanse daily.  Medication side effects include no anorexia, no headache, no tics, no irritability, no problems sleeping, no weight loss, no abdominal pain, no nausea and no suicidal ideation.      The patient is taking the following controlled substance(s) on a long term (greater than three months) basis: Lisdexamfetamine (Vyvanse).  She is taking controlled substances for other (ADD).  A history was obtained from the patient and the following were reviewed: family history, social history, psychiatric history, and substance abuse history.  A baseline assessment was performed and includes a controlled substance agreement, urine drug screen, SAMANTHA (within " 12 months prior to today's encounter), and a physical exam, if appropriate, was performed within the past year.  It is medically appropriate to prescribe her the medication(s) above.  If applicable, prior treatment records were obtained and reviewed to justify long term prescribing of controlled substances.  The patient was educated on the following: Limited use of the medication, need to discontinue the medication when her condition resolves, proper storage and disposal of medication(s), and risks and dangers associated with controlled substances including tolerance, dependence, and addiction.  A written informed consent was obtained and includes objectives of treatment, further diagnostic testing if appropriate, and an exit strategy for discontinuing the medication on the condition resolves, if appropriate.  In addition, if appropriate, the patient will be screened for other medical conditions that may impact the prescribing of controlled substances.  Previous treatments have been tried including trials of noncontrolled substances or lower doses of controlled substances.  The controlled medication(s) have been titrated to the level appropriate and necessary and the medication(s) are not causing unacceptable side effects.        Loyda Richmond is a 23 y.o. female who presents for an Annual Physical.      The patient presents for a  Pap Smear. Menarche was age 13-14. She is G [0] P [0 ]. Her menstrual cycles [are] regular. Last menstrual period was [ 6/1/18 ]. She [denies] dysmenorrhea. She [denies] menorrhagia. She [denies] dyspareunia. She [is ] currently sexually active.  She uses [oral contraceptive pills] for birth control. Her last Pap smear was [4/17/17, normal].  She [reports] HPV and Chlamydia in 2016, but denies other sexually-transmitted diseases.  She [denies] SANDRA Exposure in utero.      She [does] do self breast exam. She [denies) breast mass, breast pain, or nipple discharge. She has always had an  intermittent left inverted nipple.  Last Mammogram was [N/A ]. Last DEXA Scan was [N/A ]. Last Colonoscopy was [N/A ].     She [has] had the Gardasil vaccination.      She [denies] a family history of Colon Cancer, Colon Polyps, Breast Cancer, Uterine Cancer, Cervical Cancer, or Ovarian Cancer.      PMH, PSH, SocHx, FamHx, Allergies, and Medications: Reviewed and updated.    Outpatient Medications Prior to Visit   Medication Sig Dispense Refill   • albuterol (PROVENTIL HFA;VENTOLIN HFA) 108 (90 Base) MCG/ACT inhaler Inhale 2 puffs Every 4 (Four) Hours As Needed for Wheezing. 1 inhaler 2   • Etonogestrel (NEXPLANON SC) Inject  under the skin.     • fluticasone (FLONASE) 50 MCG/ACT nasal spray into each nostril.     • lisdexamfetamine (VYVANSE) 50 MG capsule Take 1 capsule by mouth Daily 30 capsule 0   • clindamycin-benzoyl peroxide (BENZACLIN) 1-5 % gel Apply  topically 2 (two) times a day. 50 g 0   • diclofenac (VOLTAREN) 50 MG EC tablet Take 1 tablet by mouth 2 (Two) Times a Day As Needed (pain). 60 tablet 0   • HYDROcodone-acetaminophen (NORCO) 5-325 MG per tablet Take 1 tablet by mouth At Night As Needed for Moderate Pain  or Severe Pain . 10 tablet 0   • levocetirizine (XYZAL) 5 MG tablet Take 1 tablet by mouth Every Evening. 30 tablet 0   • MethylPREDNISolone (MEDROL, JUSTIN,) 4 MG tablet Take as directed on package instructions. 1 each 0     No facility-administered medications prior to visit.        Immunization History   Administered Date(s) Administered   • HPV Quadrivalent 07/24/2007, 09/28/2007, 01/31/2008   • Hepatitis A 07/12/2006, 07/24/2007   • Hepatitis B 1994, 1994, 1994   • Meningococcal Conjugate 07/11/2005, 08/03/2011   • Tdap 07/11/2005, 07/30/2009         Patient Active Problem List   Diagnosis   • Acne   • Allergic rhinitis   • Attention or concentration deficit   • Disorder of vocal cord   • Low back pain   • Chronic diarrhea       Health Habits:  Dental Exam. up to date  Eye  Exam. up to date  Hearing Loss:  No  Exercise: 7 times/week.  Current exercise activities include: walking  Diet: Healthy  Multivitamin: Yes    Safe Driving:  Yes  Seat Belt:  Yes  Bike Helmet:  N/A  Skin Screening:  Yes  Sunscreen: Yes  SBE / GEMMA: Yes  Sexual Activity:  Yes  Birth Control:  ocp's  STD Prevention:  none    Last Pap: 4/17/17, normal  Last Mammogram:  N/A  Last DEXA Scan: N/A  Last Colonoscopy: N/A  Last PSA: N/A    Social:    Social History     Social History   • Marital status: Single     Spouse name: N/A   • Number of children: 0   • Years of education: N/A     Occupational History   • , full time     Social History Main Topics   • Smoking status: Never Smoker   • Smokeless tobacco: Never Used   • Alcohol use Yes    2 drinks (beer or wine) 2 nights per week    • Drug use: No   • Sexual activity: Defer     Other Topics Concern   • Not on file     Social History Narrative   • No narrative on file         Current Medical Providers:    Yoanna Dejesus MD (Internal Medicine / Pediatrics)    The [facility] providers who are involved in the care of this patient are listed above.         Review of Systems   Constitutional: Positive for fatigue. Negative for chills, fever and unexpected weight change.        No weight gain or weight loss.  No night sweats.  No generalized pain.   HENT: Positive for congestion, ear pain, postnasal drip, rhinorrhea, sinus pressure and sore throat. Negative for hearing loss, nosebleeds, sinus pain, sneezing, tinnitus and voice change.         Denies snoring.   Eyes: Positive for pain, discharge, redness and itching. Negative for photophobia and visual disturbance.   Respiratory: Positive for wheezing. Negative for cough, chest tightness, shortness of breath and stridor.         No orthopnea, dyspnea on exertion, or PND.  No chest congestion.   No  hemoptysis.   Cardiovascular: Negative for chest pain, palpitations and leg swelling.        No  "claudication or syncope.   Gastrointestinal: Positive for diarrhea (chronic). Negative for abdominal pain, blood in stool, constipation, nausea, rectal pain and vomiting.        No hematemesis.  No heartburn, dysphagia or odynophagia.  No belching or bloating.  No melena.   Endocrine: Negative for cold intolerance, heat intolerance, polydipsia, polyphagia and polyuria.        No hair loss or dry skin.  No generalized weakness.  No hot flashes.   Genitourinary: Negative for difficulty urinating, dyspareunia, dysuria, flank pain, frequency, hematuria, menstrual problem, pelvic pain, urgency, vaginal bleeding and vaginal discharge.        No nocturia, incomplete emptying, or incontinence.   Musculoskeletal: Negative for arthralgias, back pain, gait problem, joint swelling, myalgias, neck pain and neck stiffness.        No joint stiffness.   Skin: Negative for rash.        No new skin lesions or changes in skin lesions. No breast pain or masses.  No nipple discharge or nipple inversion.   Neurological: Negative for dizziness, tremors, syncope, speech difficulty, weakness, light-headedness, numbness and headaches.        No tingling.  No memory loss.  No decreased concentration.   Hematological: Negative for adenopathy. Does not bruise/bleed easily.   Psychiatric/Behavioral: Negative for confusion, sleep disturbance and suicidal ideas. The patient is not nervous/anxious.         No depression.           Objective     Vitals:    06/05/18 0952   BP: 104/72   Pulse: 84   Resp: 18   Temp: 98.4 °F (36.9 °C)   TempSrc: Temporal Artery    Weight: 57.2 kg (126 lb)   Height: 165.1 cm (65\")       Body mass index is 20.97 kg/m².    Physical Exam   Constitutional: She appears well-developed and well-nourished.   HENT:   Head: Normocephalic and atraumatic.   Right Ear: Tympanic membrane, external ear and ear canal normal.   Left Ear: Tympanic membrane, external ear and ear canal normal.   Mouth/Throat: Oropharynx is clear and moist. "   Eyes: Conjunctivae and EOM are normal. Pupils are equal, round, and reactive to light.   Neck: Normal range of motion. Neck supple. Carotid bruit is not present. No thyromegaly present.   Cardiovascular: Normal rate, regular rhythm and intact distal pulses.  Exam reveals no gallop and no friction rub.    No murmur heard.  Pulmonary/Chest: Effort normal and breath sounds normal. Right breast exhibits no inverted nipple, no mass, no nipple discharge, no skin change and no tenderness. Left breast exhibits inverted nipple (minimal). Left breast exhibits no mass, no nipple discharge, no skin change and no tenderness.   Abdominal: Soft. Bowel sounds are normal. She exhibits no distension, no abdominal bruit and no mass. There is no hepatosplenomegaly. There is no tenderness.   Rectal exam deferred.   Genitourinary: Vagina normal and uterus normal. Uterus is not tender. Cervix exhibits no motion tenderness, no discharge and no friability. Right adnexum displays no mass and no tenderness. Left adnexum displays no mass and no tenderness. No vaginal discharge found.   Genitourinary Comments: Cervix is without lesions or erythema.   Musculoskeletal: Normal range of motion. She exhibits no edema or tenderness.   Lymphadenopathy:     She has no cervical adenopathy.     She has no axillary adenopathy.        Right: No inguinal and no supraclavicular adenopathy present.        Left: No inguinal and no supraclavicular adenopathy present.   Neurological: She is alert. She has normal strength and normal reflexes. No cranial nerve deficit. She exhibits normal muscle tone. Coordination and gait normal.   Skin: No rash noted.   No atypical skin lesions.   Psychiatric: She has a normal mood and affect.   Nursing note and vitals reviewed.          Assessment/Plan       Loyda was seen today for annual exam.    Diagnoses and all orders for this visit:    Encounter for health maintenance examination in adult  -     TSH  -     Vitamin D 25  Hydroxy  -     CBC & Differential  -     Comprehensive Metabolic Panel  -     CBC Auto Differential    Encounter for cervical Pap smear with pelvic exam  -     Liquid-based Pap Smear, Screening; Future    Attention or concentration deficit    Other acute sinusitis, recurrence not specified  -     cefdinir (OMNICEF) 300 MG capsule; Take 1 capsule by mouth 2 (Two) Times a Day for 10 days.    High risk medication use  -     Urine Drug Screen - Urine, Clean Catch; Future    Screening for cardiovascular condition  -     TSH  -     Vitamin D 25 Hydroxy  -     CBC & Differential  -     Comprehensive Metabolic Panel  -     CBC Auto Differential         The patient agrees to check with her insurance company to see what other ADD medications are covered.      For the sinus infection, recommend continue Mucinex.  Add Allegra and / or Flonase.       The patient was instructed in the side effects of the medication. Risks of the potential for tolerance, dependence, and addiction were discussed. The patient was instructed to take the lowest dosage of the medication, at the lowest frequency, and for the shortest period of time possible. The patient was instructed not to receive controlled substances or narcotics from other doctors, and not to giveaway or sell the medication.      The patient was instructed to abstain from illicit drug use.       Narcotics/controlled substance agreement, Charles report, and Urine Drug Screen were updated today if needed.       Return in about 3 months (around 9/5/2018) for Recheck-ADD.

## 2018-06-18 ENCOUNTER — TELEPHONE (OUTPATIENT)
Dept: INTERNAL MEDICINE | Facility: CLINIC | Age: 23
End: 2018-06-18

## 2018-06-18 DIAGNOSIS — B37.31 VAGINAL CANDIDIASIS: Primary | ICD-10-CM

## 2018-06-18 RX ORDER — FLUCONAZOLE 150 MG/1
TABLET ORAL
Qty: 2 TABLET | Refills: 0 | Status: SHIPPED | OUTPATIENT
Start: 2018-06-18 | End: 2018-11-27

## 2018-06-18 NOTE — TELEPHONE ENCOUNTER
Spoke to patient. Let her know pap results are not yet back, we have to send to outside lab and these take a little more time. Try back in one week for results.     Pt states she is having vaginal itching, thick discharge. Let her know I will call in diflucan today to [facility]. Patient verbalized understanding and in agreement with this plan.       ----- Message from Paige Henry V sent at 6/18/2018  3:07 PM EDT -----  PT CALLED STATING SHE HAD PAP FEW WEEKS AGO AND HASN'T BEEN TOLD RESULTS AND SHE HAS ANOTHER YEAST INFECTION.    SHE CAN BE REACHED [phone]    [facility]OSS[facility]

## 2018-06-26 ENCOUNTER — TELEPHONE (OUTPATIENT)
Dept: INTERNAL MEDICINE | Facility: CLINIC | Age: 23
End: 2018-06-26

## 2018-06-26 DIAGNOSIS — R41.840 ATTENTION AND CONCENTRATION DEFICIT: ICD-10-CM

## 2018-06-26 NOTE — TELEPHONE ENCOUNTER
----- Message from Paige Henry V sent at 6/26/2018 11:22 AM EDT -----  PT CALLED AND STATED SHE CALL INSURANCE AND STATED THAT MEDICATION YOU HAVE HER ON IS EITHER TIER 2 OR 3. WHAT DO YOU ADVISE HER TO DO?    SHE CAN BE REACHED [phone]

## 2018-06-26 NOTE — TELEPHONE ENCOUNTER
She states the vyvance copay is tier 2 - $250 month   She states all the other medication are tier 3 .  She states she has a high deductable about $3000  And she thinks .  She looked this information up on line and she is going to try and call customer service and see if there is a tier 1 medication.     She is needing a refill on the vyvance as she is almost out and will pay the higher copay this time

## 2018-07-05 RX ORDER — DEXTROAMPHETAMINE SACCHARATE, AMPHETAMINE ASPARTATE, DEXTROAMPHETAMINE SULFATE AND AMPHETAMINE SULFATE 2.5; 2.5; 2.5; 2.5 MG/1; MG/1; MG/1; MG/1
TABLET ORAL
Qty: 100 TABLET | Refills: 0 | Status: SHIPPED | OUTPATIENT
Start: 2018-07-05 | End: 2018-11-27

## 2018-11-27 ENCOUNTER — OFFICE VISIT (OUTPATIENT)
Dept: INTERNAL MEDICINE | Facility: CLINIC | Age: 23
End: 2018-11-27

## 2018-11-27 VITALS
TEMPERATURE: 97.7 F | SYSTOLIC BLOOD PRESSURE: 102 MMHG | RESPIRATION RATE: 20 BRPM | BODY MASS INDEX: 22.63 KG/M2 | HEART RATE: 72 BPM | DIASTOLIC BLOOD PRESSURE: 70 MMHG | WEIGHT: 136 LBS

## 2018-11-27 DIAGNOSIS — R06.02 SHORTNESS OF BREATH: Primary | ICD-10-CM

## 2018-11-27 DIAGNOSIS — J98.01 ACUTE BRONCHOSPASM: ICD-10-CM

## 2018-11-27 DIAGNOSIS — J30.89 SEASONAL ALLERGIC RHINITIS DUE TO OTHER ALLERGIC TRIGGER: ICD-10-CM

## 2018-11-27 LAB
EXPIRATION DATE: NORMAL
HGB BLDA-MCNC: 15.2 G/DL
Lab: NORMAL

## 2018-11-27 PROCEDURE — 99214 OFFICE O/P EST MOD 30 MIN: CPT | Performed by: INTERNAL MEDICINE

## 2018-11-27 PROCEDURE — 85018 HEMOGLOBIN: CPT | Performed by: INTERNAL MEDICINE

## 2018-11-27 PROCEDURE — 94060 EVALUATION OF WHEEZING: CPT | Performed by: INTERNAL MEDICINE

## 2018-11-27 RX ORDER — MONTELUKAST SODIUM 10 MG/1
10 TABLET ORAL DAILY
Qty: 30 TABLET | Refills: 11 | Status: SHIPPED | OUTPATIENT
Start: 2018-11-27 | End: 2019-12-20

## 2018-11-27 RX ORDER — PREDNISONE 20 MG/1
TABLET ORAL
Qty: 11 TABLET | Refills: 0 | Status: SHIPPED | OUTPATIENT
Start: 2018-11-27 | End: 2019-06-24

## 2018-11-27 RX ORDER — DESOGESTREL AND ETHINYL ESTRADIOL 21-5 (28)
1 KIT ORAL DAILY
COMMUNITY
End: 2019-01-29 | Stop reason: SDUPTHER

## 2018-11-27 NOTE — PROGRESS NOTES
Subjective       Loyda Richmond is a 23 y.o. female.     Chief Complaint   Patient presents with   • Shortness of Breath       History obtained from the patient.      Shortness of Breath   This is a new problem. Episode onset: 2-3 months ago. Episode frequency: every nigh. Associated symptoms include rhinorrhea (clear) and wheezing (at night). Pertinent negatives include no abdominal pain, chest pain, ear pain, fever, headaches, hemoptysis, leg pain, leg swelling, orthopnea, rash, sore throat, sputum production, swollen glands, syncope or vomiting. The symptoms are aggravated by lying flat and weather changes (worse at night). Treatments tried: on Zyrtec and Flonase for allergies. Has been using the rescue inhaler every night. The treatment provided mild relief. Her past medical history is significant for allergies.        The following portions of the patient's history were reviewed and updated as appropriate: allergies, current medications, past family history, past medical history, past social history, past surgical history and problem list.      Review of Systems   Constitutional: Positive for fatigue. Negative for chills and fever.   HENT: Positive for congestion, postnasal drip, rhinorrhea (clear) and sneezing. Negative for ear pain, sinus pressure, sinus pain, sore throat and voice change.    Eyes: Negative for pain, discharge, redness and itching.   Respiratory: Positive for cough, chest tightness, shortness of breath and wheezing (at night). Negative for hemoptysis and sputum production.    Cardiovascular: Negative for chest pain, palpitations, orthopnea, leg swelling and syncope.   Gastrointestinal: Negative for abdominal pain, nausea and vomiting.   Musculoskeletal: Negative for arthralgias and myalgias.   Skin: Negative for rash.   Neurological: Negative for headaches.           Objective     Blood pressure 102/70, pulse 72, temperature 97.7 °F (36.5 °C), temperature source Temporal, resp. rate 20, weight  61.7 kg (136 lb).    Physical Exam   Constitutional: She appears well-developed and well-nourished.   HENT:   Head: Normocephalic and atraumatic.   Right Ear: Tympanic membrane, external ear and ear canal normal.   Left Ear: Tympanic membrane, external ear and ear canal normal.   Mouth/Throat: Oropharynx is clear and moist and mucous membranes are normal. No oral lesions.   Tonsils normal.  No sinus tenderness to palpation.   Eyes: Conjunctivae are normal.   Neck: Normal range of motion. Neck supple. Carotid bruit is not present. No thyroid mass and no thyromegaly present.   Cardiovascular: Normal rate, regular rhythm, normal heart sounds and intact distal pulses. Exam reveals no gallop and no friction rub.   No murmur heard.  Pulmonary/Chest: Effort normal and breath sounds normal.   Lymphadenopathy:     She has no cervical adenopathy.   Neurological: She is alert.   Skin: No rash noted.   Psychiatric: She has a normal mood and affect.   Nursing note and vitals reviewed.    Results for orders placed or performed in visit on 11/27/18   POC Hemoglobin   Result Value Ref Range    Hemoglobin 15.2 g/dL    Lot Number [id number]     Expiration Date 1-10-20      Spirometry:  Normal    Assessment/Plan   Loyda was seen today for shortness of breath.    Diagnoses and all orders for this visit:    Shortness of breath  -     POC Hemoglobin  -     Spirometry With Bronchodilator    Seasonal allergic rhinitis due to other allergic trigger  -     montelukast (SINGULAIR) 10 MG tablet; Take 1 tablet by mouth Daily.    Acute bronchospasm  -     predniSONE (DELTASONE) 20 MG tablet; 2 pills daily x 3 days, then 1 pill daily x 3 days, then 1/2 pill daily x 4 days    Patient agrees to call in 2 weeks with an update on your breathing.        Return if symptoms worsen or fail to improve.

## 2019-01-29 ENCOUNTER — TELEPHONE (OUTPATIENT)
Dept: INTERNAL MEDICINE | Facility: CLINIC | Age: 24
End: 2019-01-29

## 2019-01-29 DIAGNOSIS — Z30.41 ENCOUNTER FOR SURVEILLANCE OF CONTRACEPTIVE PILLS: Primary | ICD-10-CM

## 2019-01-29 RX ORDER — DESOGESTREL AND ETHINYL ESTRADIOL 21-5 (28)
1 KIT ORAL DAILY
Qty: 28 TABLET | Refills: 5 | Status: SHIPPED | OUTPATIENT
Start: 2019-01-29 | End: 2019-07-12 | Stop reason: SDUPTHER

## 2019-01-29 NOTE — TELEPHONE ENCOUNTER
----- Message from Dixie Beer sent at 1/29/2019 10:37 AM EST -----  PATIENT CALLED REQUESTING REFILL ON desogestrel-ethinyl estradiol (KARIVA) 0.15-0.02/0.01 MG (21/5) per tablet.    PHARMACY: [address] [phone] [facility] [phone]    THANK YOU.

## 2019-05-13 ENCOUNTER — TELEPHONE (OUTPATIENT)
Dept: INTERNAL MEDICINE | Facility: CLINIC | Age: 24
End: 2019-05-13

## 2019-05-13 DIAGNOSIS — M54.41 CHRONIC BILATERAL LOW BACK PAIN WITH RIGHT-SIDED SCIATICA: Primary | ICD-10-CM

## 2019-05-13 DIAGNOSIS — G89.29 CHRONIC BILATERAL LOW BACK PAIN WITH RIGHT-SIDED SCIATICA: Primary | ICD-10-CM

## 2019-05-13 RX ORDER — CYCLOBENZAPRINE HCL 10 MG
10 TABLET ORAL NIGHTLY PRN
Qty: 30 TABLET | Refills: 1 | Status: SHIPPED | OUTPATIENT
Start: 2019-05-13 | End: 2019-06-24 | Stop reason: SDUPTHER

## 2019-05-13 NOTE — TELEPHONE ENCOUNTER
----- Message from Dixie De Oliveira sent at 5/13/2019  2:18 PM EDT -----  Loyda called requesting a refill on FLEXERIL 10MG.    [facility]    Loyda: [phone]    Thank you.

## 2019-05-13 NOTE — TELEPHONE ENCOUNTER
Patient states she does not think that she is allergic to Flexeril, she just stated she was very sleepy while taking this medication.

## 2019-05-13 NOTE — TELEPHONE ENCOUNTER
Flexeril was removed from the patients medication allergies, notified patient that medication has been sent in via e Rx.

## 2019-06-24 ENCOUNTER — OFFICE VISIT (OUTPATIENT)
Dept: INTERNAL MEDICINE | Facility: CLINIC | Age: 24
End: 2019-06-24

## 2019-06-24 VITALS
SYSTOLIC BLOOD PRESSURE: 110 MMHG | HEART RATE: 80 BPM | DIASTOLIC BLOOD PRESSURE: 78 MMHG | TEMPERATURE: 98.7 F | BODY MASS INDEX: 23.63 KG/M2 | WEIGHT: 142 LBS | RESPIRATION RATE: 20 BRPM

## 2019-06-24 DIAGNOSIS — M54.2 NECK PAIN: Primary | ICD-10-CM

## 2019-06-24 PROCEDURE — 99214 OFFICE O/P EST MOD 30 MIN: CPT | Performed by: INTERNAL MEDICINE

## 2019-06-24 RX ORDER — CYCLOBENZAPRINE HCL 10 MG
10 TABLET ORAL NIGHTLY PRN
Qty: 30 TABLET | Refills: 1 | Status: SHIPPED | OUTPATIENT
Start: 2019-06-24 | End: 2020-01-17

## 2019-06-24 RX ORDER — MELOXICAM 15 MG/1
15 TABLET ORAL DAILY PRN
Qty: 30 TABLET | Refills: 2 | Status: SHIPPED | OUTPATIENT
Start: 2019-06-24 | End: 2020-01-17

## 2019-06-24 NOTE — PATIENT INSTRUCTIONS
Continue NSAIDS for 2 full weeks, then as needed. Continue Flexeril as needed.  Recommend heat 2-3 times daily.

## 2019-06-24 NOTE — PROGRESS NOTES
Subjective       Loyda Richmond is a 24 y.o. female.     Chief Complaint   Patient presents with   • Neck Pain       History obtained from the patient.      Neck Pain    This is a new problem. Episode onset: 4-5 months ago. The problem occurs constantly. The problem has been gradually improving. Associated with: started after a stressful period, no known injury. Pain location: posterior right side, radiates some to the right shoulder. The quality of the pain is described as aching and shooting. The pain is moderate. The symptoms are aggravated by twisting, sneezing, position, coughing and bending. The pain is worse during the day. Stiffness is present all day. Pertinent negatives include no chest pain, fever, headaches, numbness, pain with swallowing, photophobia, tingling, trouble swallowing, visual change or weight loss. She has tried NSAIDs, muscle relaxants and ice (and massage) for the symptoms. The treatment provided mild relief.        The following portions of the patient's history were reviewed and updated as appropriate: allergies, current medications, past family history, past medical history, past social history, past surgical history and problem list.      Review of Systems   Constitutional: Negative for chills, fever and weight loss.   HENT: Positive for congestion and rhinorrhea. Negative for ear pain, postnasal drip, sinus pressure, sinus pain, sore throat and trouble swallowing.    Eyes: Negative for photophobia.   Respiratory: Negative for cough and shortness of breath.    Cardiovascular: Negative for chest pain.   Gastrointestinal: Positive for diarrhea (chronic). Negative for abdominal pain, blood in stool, constipation, nausea and vomiting.   Musculoskeletal: Positive for back pain (chronic) and neck pain.   Skin: Negative for rash.   Neurological: Negative for tingling, numbness and headaches.   Hematological: Negative for adenopathy.           Objective     Blood pressure 110/78, pulse 80,  temperature 98.7 °F (37.1 °C), temperature source Temporal, resp. rate 20, weight 64.4 kg (142 lb).    Physical Exam   Constitutional: She appears well-developed and well-nourished.   Neck: Normal range of motion. Neck supple. Carotid bruit is not present. No thyroid mass and no thyromegaly present.   There is no tenderness to palpation of the cervical spine or paraspinal muscles.  There is mild pain with flexion, extension, and lateral bending of the neck.   Cardiovascular: Normal rate, regular rhythm and normal heart sounds.   No murmur heard.  Pulmonary/Chest: Effort normal and breath sounds normal.   Musculoskeletal:   Good  strength bilaterally.  Straight leg raise negative bilaterally.   Lymphadenopathy:     She has no cervical adenopathy.   Neurological: She is alert. She has normal strength and normal reflexes.   Upper extremity only examined.   Skin: No rash noted.   Psychiatric: She has a normal mood and affect.   Nursing note and vitals reviewed.        Assessment/Plan   Loyda was seen today for neck pain.    Diagnoses and all orders for this visit:    Neck pain  -     Ambulatory Referral to Physical Therapy Evaluate and treat  -     meloxicam (MOBIC) 15 MG tablet; Take 1 tablet by mouth Daily As Needed for Moderate Pain .  -     cyclobenzaprine (FLEXERIL) 10 MG tablet; Take 1 tablet by mouth At Night As Needed for Muscle Spasms.    Continue NSAIDS for 2 full weeks, then as needed. Continue Flexeril as needed.  Recommend heat 2-3 times daily.        Return if symptoms worsen or fail to improve.

## 2019-07-12 ENCOUNTER — TELEPHONE (OUTPATIENT)
Dept: INTERNAL MEDICINE | Facility: CLINIC | Age: 24
End: 2019-07-12

## 2019-07-12 DIAGNOSIS — Z30.41 ENCOUNTER FOR SURVEILLANCE OF CONTRACEPTIVE PILLS: ICD-10-CM

## 2019-07-12 RX ORDER — DESOGESTREL AND ETHINYL ESTRADIOL 21-5 (28)
1 KIT ORAL DAILY
Qty: 28 TABLET | Refills: 0 | Status: SHIPPED | OUTPATIENT
Start: 2019-07-12 | End: 2019-07-30 | Stop reason: SDUPTHER

## 2019-07-12 NOTE — TELEPHONE ENCOUNTER
----- Message from Elvia Christine sent at 7/12/2019  5:34 PM EDT -----  Patient states she needs a refill on desogestrel-ethinyl estradiol (KARIVA) 0.15-0.02/0.01 MG (21/5) per tablet [facility]. She can be reached at [phone].

## 2019-07-30 ENCOUNTER — TELEPHONE (OUTPATIENT)
Dept: INTERNAL MEDICINE | Facility: CLINIC | Age: 24
End: 2019-07-30

## 2019-07-30 DIAGNOSIS — Z30.41 ENCOUNTER FOR SURVEILLANCE OF CONTRACEPTIVE PILLS: ICD-10-CM

## 2019-07-30 RX ORDER — DESOGESTREL AND ETHINYL ESTRADIOL 21-5 (28)
1 KIT ORAL DAILY
Qty: 84 TABLET | Refills: 3 | Status: CANCELLED | OUTPATIENT
Start: 2019-07-30

## 2019-07-30 RX ORDER — DESOGESTREL AND ETHINYL ESTRADIOL 21-5 (28)
1 KIT ORAL DAILY
Qty: 84 TABLET | Refills: 3 | Status: SHIPPED | OUTPATIENT
Start: 2019-07-30 | End: 2020-01-17

## 2019-07-30 NOTE — TELEPHONE ENCOUNTER
----- Message from Irish Short sent at 7/30/2019 12:54 PM EDT -----  Contact: self  Loyda Richmond calling for a refill for her birth control. She has 2 weeks left but is calling early incase she needs an appt before her refill. She uses the [facility] in [facility]. Loyda can be reached at [phone]

## 2019-12-19 DIAGNOSIS — J30.89 SEASONAL ALLERGIC RHINITIS DUE TO OTHER ALLERGIC TRIGGER: ICD-10-CM

## 2019-12-20 RX ORDER — MONTELUKAST SODIUM 10 MG/1
TABLET ORAL
Qty: 30 TABLET | Refills: 10 | Status: SHIPPED | OUTPATIENT
Start: 2019-12-20 | End: 2023-01-23

## 2020-01-17 ENCOUNTER — OFFICE VISIT (OUTPATIENT)
Dept: INTERNAL MEDICINE | Facility: CLINIC | Age: 25
End: 2020-01-17

## 2020-01-17 VITALS
HEART RATE: 84 BPM | OXYGEN SATURATION: 99 % | DIASTOLIC BLOOD PRESSURE: 74 MMHG | RESPIRATION RATE: 16 BRPM | HEIGHT: 65 IN | TEMPERATURE: 98.8 F | BODY MASS INDEX: 23.32 KG/M2 | WEIGHT: 140 LBS | SYSTOLIC BLOOD PRESSURE: 112 MMHG

## 2020-01-17 DIAGNOSIS — R41.840 ATTENTION OR CONCENTRATION DEFICIT: ICD-10-CM

## 2020-01-17 DIAGNOSIS — Z79.899 HIGH RISK MEDICATION USE: ICD-10-CM

## 2020-01-17 DIAGNOSIS — J30.89 NON-SEASONAL ALLERGIC RHINITIS DUE TO OTHER ALLERGIC TRIGGER: ICD-10-CM

## 2020-01-17 DIAGNOSIS — Z13.6 SCREENING FOR CARDIOVASCULAR CONDITION: ICD-10-CM

## 2020-01-17 DIAGNOSIS — Z00.00 ENCOUNTER FOR HEALTH MAINTENANCE EXAMINATION IN ADULT: Primary | ICD-10-CM

## 2020-01-17 PROCEDURE — 99395 PREV VISIT EST AGE 18-39: CPT | Performed by: INTERNAL MEDICINE

## 2020-01-17 NOTE — PATIENT INSTRUCTIONS
Please return for fasting labs.    Health Maintenance, Female  Adopting a healthy lifestyle and getting preventive care can go a long way to promote health and wellness. Talk with your health care provider about what schedule of regular examinations is right for you. This is a good chance for you to check in with your provider about disease prevention and staying healthy.  In between checkups, there are plenty of things you can do on your own. Experts have done a lot of research about which lifestyle changes and preventive measures are most likely to keep you healthy. Ask your health care provider for more information.  Weight and diet  Eat a healthy diet  · Be sure to include plenty of vegetables, fruits, low-fat dairy products, and lean protein.  · Do not eat a lot of foods high in solid fats, added sugars, or salt.  · Get regular exercise. This is one of the most important things you can do for your health.  ? Most adults should exercise for at least 150 minutes each week. The exercise should increase your heart rate and make you sweat (moderate-intensity exercise).  ? Most adults should also do strengthening exercises at least twice a week. This is in addition to the moderate-intensity exercise.  Maintain a healthy weight  · Body mass index (BMI) is a measurement that can be used to identify possible weight problems. It estimates body fat based on height and weight. Your health care provider can help determine your BMI and help you achieve or maintain a healthy weight.  · For females 20 years of age and older:  ? A BMI below 18.5 is considered underweight.  ? A BMI of 18.5 to 24.9 is normal.  ? A BMI of 25 to 29.9 is considered overweight.  ? A BMI of 30 and above is considered obese.  Watch levels of cholesterol and blood lipids  · You should start having your blood tested for lipids and cholesterol at 20 years of age, then have this test every 5 years.  · You may need to have your cholesterol levels checked  more often if:  ? Your lipid or cholesterol levels are high.  ? You are older than 50 years of age.  ? You are at high risk for heart disease.  Cancer screening  Lung Cancer  · Lung cancer screening is recommended for adults 55-80 years old who are at high risk for lung cancer because of a history of smoking.  · A yearly low-dose CT scan of the lungs is recommended for people who:  ? Currently smoke.  ? Have quit within the past 15 years.  ? Have at least a 30-pack-year history of smoking. A pack year is smoking an average of one pack of cigarettes a day for 1 year.  · Yearly screening should continue until it has been 15 years since you quit.  · Yearly screening should stop if you develop a health problem that would prevent you from having lung cancer treatment.  Breast Cancer  · Practice breast self-awareness. This means understanding how your breasts normally appear and feel.  · It also means doing regular breast self-exams. Let your health care provider know about any changes, no matter how small.  · If you are in your 20s or 30s, you should have a clinical breast exam (CBE) by a health care provider every 1-3 years as part of a regular health exam.  · If you are 40 or older, have a CBE every year. Also consider having a breast X-ray (mammogram) every year.  · If you have a family history of breast cancer, talk to your health care provider about genetic screening.  · If you are at high risk for breast cancer, talk to your health care provider about having an MRI and a mammogram every year.  · Breast cancer gene (BRCA) assessment is recommended for women who have family members with BRCA-related cancers. BRCA-related cancers include:  ? Breast.  ? Ovarian.  ? Tubal.  ? Peritoneal cancers.  · Results of the assessment will determine the need for genetic counseling and BRCA1 and BRCA2 testing.  Cervical Cancer  Your health care provider may recommend that you be screened regularly for cancer of the pelvic organs  (ovaries, uterus, and vagina). This screening involves a pelvic examination, including checking for microscopic changes to the surface of your cervix (Pap test). You may be encouraged to have this screening done every 3 years, beginning at age 21.  · For women ages 30-65, health care providers may recommend pelvic exams and Pap testing every 3 years, or they may recommend the Pap and pelvic exam, combined with testing for human papilloma virus (HPV), every 5 years. Some types of HPV increase your risk of cervical cancer. Testing for HPV may also be done on women of any age with unclear Pap test results.  · Other health care providers may not recommend any screening for nonpregnant women who are considered low risk for pelvic cancer and who do not have symptoms. Ask your health care provider if a screening pelvic exam is right for you.  · If you have had past treatment for cervical cancer or a condition that could lead to cancer, you need Pap tests and screening for cancer for at least 20 years after your treatment. If Pap tests have been discontinued, your risk factors (such as having a new sexual partner) need to be reassessed to determine if screening should resume. Some women have medical problems that increase the chance of getting cervical cancer. In these cases, your health care provider may recommend more frequent screening and Pap tests.  Colorectal Cancer  · This type of cancer can be detected and often prevented.  · Routine colorectal cancer screening usually begins at 50 years of age and continues through 75 years of age.  · Your health care provider may recommend screening at an earlier age if you have risk factors for colon cancer.  · Your health care provider may also recommend using home test kits to check for hidden blood in the stool.  · A small camera at the end of a tube can be used to examine your colon directly (sigmoidoscopy or colonoscopy). This is done to check for the earliest forms of  colorectal cancer.  · Routine screening usually begins at age 50.  · Direct examination of the colon should be repeated every 5-10 years through 75 years of age. However, you may need to be screened more often if early forms of precancerous polyps or small growths are found.  Skin Cancer  · Check your skin from head to toe regularly.  · Tell your health care provider about any new moles or changes in moles, especially if there is a change in a mole's shape or color.  · Also tell your health care provider if you have a mole that is larger than the size of a pencil eraser.  · Always use sunscreen. Apply sunscreen liberally and repeatedly throughout the day.  · Protect yourself by wearing long sleeves, pants, a wide-brimmed hat, and sunglasses whenever you are outside.  Heart disease, diabetes, and high blood pressure  · High blood pressure causes heart disease and increases the risk of stroke. High blood pressure is more likely to develop in:  ? People who have blood pressure in the high end of the normal range (130-139/85-89 mm Hg).  ? People who are overweight or obese.  ? People who are .  · If you are 18-39 years of age, have your blood pressure checked every 3-5 years. If you are 40 years of age or older, have your blood pressure checked every year. You should have your blood pressure measured twice--once when you are at a hospital or clinic, and once when you are not at a hospital or clinic. Record the average of the two measurements. To check your blood pressure when you are not at a hospital or clinic, you can use:  ? An automated blood pressure machine at a pharmacy.  ? A home blood pressure monitor.  · If you are between 55 years and 79 years old, ask your health care provider if you should take aspirin to prevent strokes.  · Have regular diabetes screenings. This involves taking a blood sample to check your fasting blood sugar level.  ? If you are at a normal weight and have a low risk for  diabetes, have this test once every three years after 45 years of age.  ? If you are overweight and have a high risk for diabetes, consider being tested at a younger age or more often.  Preventing infection  Hepatitis B  · If you have a higher risk for hepatitis B, you should be screened for this virus. You are considered at high risk for hepatitis B if:  ? You were born in a country where hepatitis B is common. Ask your health care provider which countries are considered high risk.  ? Your parents were born in a high-risk country, and you have not been immunized against hepatitis B (hepatitis B vaccine).  ? You have HIV or AIDS.  ? You use needles to inject street drugs.  ? You live with someone who has hepatitis B.  ? You have had sex with someone who has hepatitis B.  ? You get hemodialysis treatment.  ? You take certain medicines for conditions, including cancer, organ transplantation, and autoimmune conditions.  Hepatitis C  · Blood testing is recommended for:  ? Everyone born from 1945 through 1965.  ? Anyone with known risk factors for hepatitis C.  Sexually transmitted infections (STIs)  · You should be screened for sexually transmitted infections (STIs) including gonorrhea and chlamydia if:  ? You are sexually active and are younger than 24 years of age.  ? You are older than 24 years of age and your health care provider tells you that you are at risk for this type of infection.  ? Your sexual activity has changed since you were last screened and you are at an increased risk for chlamydia or gonorrhea. Ask your health care provider if you are at risk.  · If you do not have HIV, but are at risk, it may be recommended that you take a prescription medicine daily to prevent HIV infection. This is called pre-exposure prophylaxis (PrEP). You are considered at risk if:  ? You are sexually active and do not regularly use condoms or know the HIV status of your partner(s).  ? You take drugs by injection.  ? You are  sexually active with a partner who has HIV.  Talk with your health care provider about whether you are at high risk of being infected with HIV. If you choose to begin PrEP, you should first be tested for HIV. You should then be tested every 3 months for as long as you are taking PrEP.  Pregnancy  · If you are premenopausal and you may become pregnant, ask your health care provider about preconception counseling.  · If you may become pregnant, take 400 to 800 micrograms (mcg) of folic acid every day.  · If you want to prevent pregnancy, talk to your health care provider about birth control (contraception).  Osteoporosis and menopause  · Osteoporosis is a disease in which the bones lose minerals and strength with aging. This can result in serious bone fractures. Your risk for osteoporosis can be identified using a bone density scan.  · If you are 65 years of age or older, or if you are at risk for osteoporosis and fractures, ask your health care provider if you should be screened.  · Ask your health care provider whether you should take a calcium or vitamin D supplement to lower your risk for osteoporosis.  · Menopause may have certain physical symptoms and risks.  · Hormone replacement therapy may reduce some of these symptoms and risks.  Talk to your health care provider about whether hormone replacement therapy is right for you.  Follow these instructions at home:  · Schedule regular health, dental, and eye exams.  · Stay current with your immunizations.  · Do not use any tobacco products including cigarettes, chewing tobacco, or electronic cigarettes.  · If you are pregnant, do not drink alcohol.  · If you are breastfeeding, limit how much and how often you drink alcohol.  · Limit alcohol intake to no more than 1 drink per day for nonpregnant women. One drink equals 12 ounces of beer, 5 ounces of wine, or 1½ ounces of hard liquor.  · Do not use street drugs.  · Do not share needles.  · Ask your health care  provider for help if you need support or information about quitting drugs.  · Tell your health care provider if you often feel depressed.  · Tell your health care provider if you have ever been abused or do not feel safe at home.  This information is not intended to replace advice given to you by your health care provider. Make sure you discuss any questions you have with your health care provider.  Document Released: 07/02/2012 Document Revised: 05/25/2017 Document Reviewed: 09/20/2016  [employer] Patient Education © 2019 [employer]

## 2020-01-17 NOTE — PROGRESS NOTES
Subjective     Chief Complaint:  Physical Exam.    History of Present Illness    History obtained from the patient.     The patient states her cough and wheezing completely resolved since Singulair started.  She is still taking Zyrtec daily and Flonase as needed.  She is using her albuterol inhaler about once every 3 weeks.    ADD Follow-Up: The patient's ADHD subtype is the predominantly inattentive type. Her ADHD has been well controlled since the last visit.   Interval Events: She has been off her Vyvanse since February 2018 due to increased price with her previous insurance.  She now has a new insurance plan and would like to restart the medication.  She was previously on Vyvanse 50 mg daily, but would like to restart at a lower dose.  Symptoms: Complains of difficulty concentrating.  Denies memory loss.  No hyperactivity, restlessness, impulsivity.  Medication: None.  Side Effects: None previously with Vyvanse.    Loyda Richmond is a 24 y.o. female who presents for an Annual Physical.      PMH, PSH, SocHx, FamHx, Allergies, and Medications: Reviewed and updated.    Outpatient Medications Prior to Visit   Medication Sig Dispense Refill   • albuterol (PROVENTIL HFA;VENTOLIN HFA) 108 (90 Base) MCG/ACT inhaler Inhale 2 puffs Every 4 (Four) Hours As Needed for Wheezing. 1 inhaler 2   • cetirizine (zyrTEC) 10 MG tablet Take 10 mg by mouth Daily.     • fluticasone (FLONASE) 50 MCG/ACT nasal spray into each nostril.     • montelukast (SINGULAIR) 10 MG tablet TAKE ONE TABLET BY MOUTH DAILY 30 tablet 10   • Norethin-Eth Estrad-Fe Biphas (LO LOESTRIN FE) 1 MG-10 MCG / 10 MCG tablet Take  by mouth Daily.     • cyclobenzaprine (FLEXERIL) 10 MG tablet Take 1 tablet by mouth At Night As Needed for Muscle Spasms. 30 tablet 1   • desogestrel-ethinyl estradiol (KARIVA) 0.15-0.02/0.01 MG (21/5) per tablet Take 1 tablet by mouth Daily. 84 tablet 3   • meloxicam (MOBIC) 15 MG tablet Take 1 tablet by mouth Daily As Needed  for Moderate Pain . 30 tablet 2     No facility-administered medications prior to visit.        Immunization History   Administered Date(s) Administered   • DTaP, Unspecified 03/30/1998   • FLUARIX/FLUZONE/AFLURIA/FLULAVAL QUAD 10/02/2019   • Flu Vaccine Quad PF >36MO 10/15/2018   • HPV Quadrivalent 07/24/2007, 09/28/2007, 01/31/2008   • Hepatitis A 07/12/2006, 07/24/2007   • Hepatitis B 1994, 1994, 1994   • MMR 03/30/1998   • Meningococcal Conjugate 07/11/2005, 08/03/2011   • OPV 03/30/1998   • Tdap 07/11/2005, 07/30/2009, 10/02/2019   • Varicella 01/30/1998         Patient Active Problem List   Diagnosis   • Acne   • Allergic rhinitis   • Attention or concentration deficit   • Disorder of vocal cord   • Low back pain   • Chronic diarrhea       Health Habits:  Dental Exam. up to date  Eye Exam. up to date  Hearing Loss:  No  Exercise: 3 times/week.  Current exercise activities include: cardiovascular workout on exercise equipment, running/ jogging and walking  Diet: Healthy  Multivitamin: No    Safe Driving:  Yes  Seat Belt:  Yes  Bike Helmet:  N/A  Skin Screening:  Yes  Sunscreen: Yes  SBE / GEMMA: No  Sexual Activity:  Yes  Birth Control:  ocp's  STD Prevention:  None (longterm partner    Last Pap: August 2019, normal per patient (GYN)  Last Mammogram:  N/A  Last DEXA Scan: N/A  Last Colonoscopy: N/A  Last PSA: N/A      Social:    Social History     Socioeconomic History   • Marital status: Single     Spouse name: Not on file   • Number of children: 0   • Years of education: Not on file   • Highest education level: Not on file   Occupational History   • Occupation: Marketing     Comment: full time   Tobacco Use   • Smoking status: Never Smoker   • Smokeless tobacco: Never Used   Substance and Sexual Activity   • Alcohol use: Yes     Comment: 1 drinks (beer or wine) 1 night per week   • Drug use: No   • Sexual activity: Yes     Partners: Male     Birth control/protection: OCP         Current  Medical Providers:    Yoanna Dejesus MD (Internal Medicine / Pediatrics)    The [facility] providers who are involved in the care of this patient are listed above.         Review of Systems   Constitutional: Negative for chills, fatigue, fever and unexpected weight change.        No night sweats.    HENT: Negative for congestion, ear pain, hearing loss, nosebleeds, postnasal drip, rhinorrhea, sinus pressure, sinus pain, sneezing, sore throat, tinnitus and voice change.         Denies snoring.   Eyes: Negative for photophobia, pain, discharge, redness, itching and visual disturbance.   Respiratory: Negative for cough, chest tightness, shortness of breath, wheezing and stridor.         No chest congestion.  No hemoptysis.   Cardiovascular: Negative for chest pain, palpitations and leg swelling.        No orthopnea, GAYTAN, or PND.  No claudication or syncope.   Gastrointestinal: Negative for abdominal pain, blood in stool, constipation, diarrhea, nausea, rectal pain and vomiting.        No melena.  No hematemesis.  No heartburn, dysphagia or odynophagia.  No early satiety, belching, or bloating.    Endocrine: Negative for cold intolerance, heat intolerance, polydipsia, polyphagia and polyuria.        No hair loss or dry skin.  No hot flashes.     Genitourinary: Positive for dyspareunia (resolved with change in ocp by GYN ) and menstrual problem (resolved with change in ocp by GYN ). Negative for difficulty urinating, dysuria, flank pain, frequency, hematuria, pelvic pain, urgency, vaginal bleeding and vaginal discharge.        No nocturia, incomplete emptying, or incontinence.   Musculoskeletal: Positive for neck pain (chronic). Negative for arthralgias, back pain, gait problem, joint swelling, myalgias and neck stiffness.        No joint stiffness.   Skin: Negative for rash.        No new skin lesions or changes in skin lesions.  Her nipples are always intermittently inverted partially.  No breast pain or masses.   "No nipple discharge.   Neurological: Negative for dizziness, tremors, syncope, speech difficulty, weakness, light-headedness, numbness and headaches.        No tingling.  No memory loss.     Hematological: Negative for adenopathy. Does not bruise/bleed easily.   Psychiatric/Behavioral: Positive for decreased concentration. Negative for confusion, sleep disturbance and suicidal ideas. The patient is not nervous/anxious.         No depression.           Objective     Vitals:    01/17/20 1143   BP: 112/74   Pulse: 84   Resp: 16   Temp: 98.8 °F (37.1 °C)   TempSrc: Temporal   SpO2: 99%   Weight: 63.5 kg (140 lb)   Height: 165.1 cm (65\")   PainSc: 0-No pain       Body mass index is 23.3 kg/m².    Physical Exam   Constitutional: She appears well-developed and well-nourished.   HENT:   Head: Normocephalic and atraumatic.   Right Ear: Tympanic membrane, external ear and ear canal normal.   Left Ear: Tympanic membrane, external ear and ear canal normal.   Mouth/Throat: Oropharynx is clear and moist. No oral lesions.   Tonsils normal.   Eyes: Pupils are equal, round, and reactive to light. Conjunctivae and EOM are normal.   Neck: Normal range of motion. Neck supple. Carotid bruit is not present. No thyroid mass and no thyromegaly present.   Cardiovascular: Normal rate, regular rhythm, normal heart sounds and intact distal pulses. Exam reveals no gallop and no friction rub.   No murmur heard.  No peripheral edema.   Pulmonary/Chest: Effort normal and breath sounds normal. Right breast exhibits inverted nipple (intermittent). Right breast exhibits no mass, no nipple discharge, no skin change and no tenderness. Left breast exhibits inverted nipple (intermittent). Left breast exhibits no mass, no nipple discharge, no skin change and no tenderness.   Abdominal: Soft. Bowel sounds are normal. She exhibits no distension, no abdominal bruit and no mass. There is no hepatosplenomegaly. There is no tenderness.   Genitourinary: "   Genitourinary Comments:  and rectal exam deferred.   Musculoskeletal: Normal range of motion.   Lymphadenopathy:     She has no cervical adenopathy.     She has no axillary adenopathy. No inguinal adenopathy noted on the right or left side.        Right: No inguinal and no supraclavicular adenopathy present.        Left: No inguinal and no supraclavicular adenopathy present.   Neurological: She is alert. She has normal strength and normal reflexes. No cranial nerve deficit. Coordination and gait normal.   Skin: No lesion and no rash noted.   No atypical skin lesions.   Psychiatric: She has a normal mood and affect.   Nursing note and vitals reviewed.      PHQ-2 Depression Screening  Little interest or pleasure in doing things? 0   Feeling down, depressed, or hopeless? 0   PHQ-2 Total Score 0         Counseling was given to patient for the following topics:  appropriate exercise, healthy eating habits, disease prevention, risk factors for cancer, importance of self breast exam and breast health, importance of immunizations, including risks and benefits, sun safety, seatbelt use, safe driving and safe sex. Also discussed the importance of regular dental and vision care, as well recommendation for a yearly screening skin exam after age 40.  Written information provided to patient on these topics and other health maintenance issues.      Assessment/Plan       Loyda was seen today for annual exam and add.    Diagnoses and all orders for this visit:    Encounter for health maintenance examination in adult  -     Lipid Panel; Future  -     Comprehensive Metabolic Panel; Future  -     TSH; Future  -     Vitamin D 25 Hydroxy; Future  -     CBC & Differential; Future    Attention or concentration deficit  -     lisdexamfetamine (VYVANSE) 30 MG capsule; Take 1 capsule by mouth Every Morning    The patient was instructed in the side effects of the medication.  Risks of the potential for tolerance, dependence, and addiction  were discussed.  The patient was instructed to take the lowest dosage of the medication, at the lowest frequency, and for the shortest period of time possible.  The patient was instructed not to receive controlled substances or narcotics from other doctors, and not to giveaway or sell the medication.    The patient was instructed to abstain from illicit drug use.  The patient was instructed to avoid alcohol while taking these medications.      Narcotics/controlled substance agreement, Charles report, and Urine Drug Screen were updated today if needed.    Non-seasonal allergic rhinitis due to other allergic trigger    High risk medication use  -     Urine Drug Screen - Urine, Clean Catch; Future    Screening for cardiovascular condition  -     Lipid Panel; Future  -     Comprehensive Metabolic Panel; Future  -     TSH; Future  -     Vitamin D 25 Hydroxy; Future  -     CBC & Differential; Future         The patient agrees to  return for fasting labs.      Return in about 3 months (around 4/17/2020) for Recheck ADD.

## 2020-01-28 ENCOUNTER — LAB (OUTPATIENT)
Dept: INTERNAL MEDICINE | Facility: CLINIC | Age: 25
End: 2020-01-28

## 2020-01-28 DIAGNOSIS — Z13.6 SCREENING FOR CARDIOVASCULAR CONDITION: ICD-10-CM

## 2020-01-28 DIAGNOSIS — Z00.00 ENCOUNTER FOR HEALTH MAINTENANCE EXAMINATION IN ADULT: ICD-10-CM

## 2020-01-28 LAB
25(OH)D3 SERPL-MCNC: 42.5 NG/ML (ref 30–100)
ALBUMIN SERPL-MCNC: 4.3 G/DL (ref 3.5–5.2)
ALBUMIN/GLOB SERPL: 1.6 G/DL
ALP SERPL-CCNC: 48 U/L (ref 39–117)
ALT SERPL W P-5'-P-CCNC: 11 U/L (ref 1–33)
ANION GAP SERPL CALCULATED.3IONS-SCNC: 14.4 MMOL/L (ref 5–15)
AST SERPL-CCNC: 18 U/L (ref 1–32)
BASOPHILS # BLD AUTO: 0.04 10*3/MM3 (ref 0–0.2)
BASOPHILS NFR BLD AUTO: 0.8 % (ref 0–1.5)
BILIRUB SERPL-MCNC: 0.4 MG/DL (ref 0.2–1.2)
BUN BLD-MCNC: 9 MG/DL (ref 6–20)
BUN/CREAT SERPL: 9 (ref 7–25)
CALCIUM SPEC-SCNC: 8.8 MG/DL (ref 8.6–10.5)
CHLORIDE SERPL-SCNC: 103 MMOL/L (ref 98–107)
CHOLEST SERPL-MCNC: 149 MG/DL (ref 0–200)
CO2 SERPL-SCNC: 23.6 MMOL/L (ref 22–29)
CREAT BLD-MCNC: 1 MG/DL (ref 0.57–1)
DEPRECATED RDW RBC AUTO: 44.2 FL (ref 37–54)
EOSINOPHIL # BLD AUTO: 0.14 10*3/MM3 (ref 0–0.4)
EOSINOPHIL NFR BLD AUTO: 2.7 % (ref 0.3–6.2)
ERYTHROCYTE [DISTWIDTH] IN BLOOD BY AUTOMATED COUNT: 14.5 % (ref 12.3–15.4)
GFR SERPL CREATININE-BSD FRML MDRD: 68 ML/MIN/1.73
GLOBULIN UR ELPH-MCNC: 2.7 GM/DL
GLUCOSE BLD-MCNC: 80 MG/DL (ref 65–99)
HCT VFR BLD AUTO: 39.1 % (ref 34–46.6)
HDLC SERPL-MCNC: 61 MG/DL (ref 40–60)
HGB BLD-MCNC: 13 G/DL (ref 12–15.9)
IMM GRANULOCYTES # BLD AUTO: 0.01 10*3/MM3 (ref 0–0.05)
IMM GRANULOCYTES NFR BLD AUTO: 0.2 % (ref 0–0.5)
LDLC SERPL CALC-MCNC: 67 MG/DL (ref 0–100)
LDLC/HDLC SERPL: 1.1 {RATIO}
LYMPHOCYTES # BLD AUTO: 1.71 10*3/MM3 (ref 0.7–3.1)
LYMPHOCYTES NFR BLD AUTO: 32.8 % (ref 19.6–45.3)
MCH RBC QN AUTO: 27.8 PG (ref 26.6–33)
MCHC RBC AUTO-ENTMCNC: 33.2 G/DL (ref 31.5–35.7)
MCV RBC AUTO: 83.7 FL (ref 79–97)
MONOCYTES # BLD AUTO: 0.45 10*3/MM3 (ref 0.1–0.9)
MONOCYTES NFR BLD AUTO: 8.6 % (ref 5–12)
NEUTROPHILS # BLD AUTO: 2.87 10*3/MM3 (ref 1.7–7)
NEUTROPHILS NFR BLD AUTO: 54.9 % (ref 42.7–76)
NRBC BLD AUTO-RTO: 0 /100 WBC (ref 0–0.2)
PLATELET # BLD AUTO: 270 10*3/MM3 (ref 140–450)
PMV BLD AUTO: 10.5 FL (ref 6–12)
POTASSIUM BLD-SCNC: 4 MMOL/L (ref 3.5–5.2)
PROT SERPL-MCNC: 7 G/DL (ref 6–8.5)
RBC # BLD AUTO: 4.67 10*6/MM3 (ref 3.77–5.28)
SODIUM BLD-SCNC: 141 MMOL/L (ref 136–145)
TRIGL SERPL-MCNC: 103 MG/DL (ref 0–150)
TSH SERPL DL<=0.05 MIU/L-ACNC: 1.71 UIU/ML (ref 0.27–4.2)
VLDLC SERPL-MCNC: 20.6 MG/DL (ref 5–40)
WBC NRBC COR # BLD: 5.22 10*3/MM3 (ref 3.4–10.8)

## 2020-01-28 PROCEDURE — 85025 COMPLETE CBC W/AUTO DIFF WBC: CPT | Performed by: INTERNAL MEDICINE

## 2020-01-28 PROCEDURE — 84443 ASSAY THYROID STIM HORMONE: CPT | Performed by: INTERNAL MEDICINE

## 2020-01-28 PROCEDURE — 80061 LIPID PANEL: CPT | Performed by: INTERNAL MEDICINE

## 2020-01-28 PROCEDURE — 80053 COMPREHEN METABOLIC PANEL: CPT | Performed by: INTERNAL MEDICINE

## 2020-01-28 PROCEDURE — 82306 VITAMIN D 25 HYDROXY: CPT | Performed by: INTERNAL MEDICINE

## 2020-01-28 PROCEDURE — 36415 COLL VENOUS BLD VENIPUNCTURE: CPT | Performed by: INTERNAL MEDICINE

## 2020-03-05 ENCOUNTER — TELEPHONE (OUTPATIENT)
Dept: INTERNAL MEDICINE | Facility: CLINIC | Age: 25
End: 2020-03-05

## 2020-03-05 NOTE — TELEPHONE ENCOUNTER
PT CALLED STATES SHE STARTED THE FOLLOWING MEDICATION AND HAS BEEN HAVING COLORED BOWEL MOVEMENTS.     cefdinir (OMNICEF) 300 MG capsule    PT STATES SHE STARTED THE MEDICATION ON 3/2/20 AND HAS EXPERIENCED HER BOWL MOVEMENTS GOING FROM ORANGE TO BRIGHT RED.     PT IS REQUESTING A CALL BACK TO SEE IF THESE SYMPTOMS ARE NORMAL.    CONTACT: [phone]

## 2020-03-06 NOTE — TELEPHONE ENCOUNTER
Spoke with Loyda she said that she doesn't think it is blood but the color of the actual stool was a reddish color. Today when she went to the bathroom it was an orangey color again. She hasn't had any GI symptoms other then her normal that she has been experiencing for a while now. Some nausea, a little diarrhea.

## 2020-03-26 ENCOUNTER — TELEPHONE (OUTPATIENT)
Dept: INTERNAL MEDICINE | Facility: CLINIC | Age: 25
End: 2020-03-26

## 2020-03-26 DIAGNOSIS — N76.0 ACUTE VAGINITIS: Primary | ICD-10-CM

## 2020-03-26 RX ORDER — FLUCONAZOLE 150 MG/1
150 TABLET ORAL ONCE
Qty: 2 TABLET | Refills: 0 | Status: SHIPPED | OUTPATIENT
Start: 2020-03-26 | End: 2020-03-26

## 2020-03-26 NOTE — TELEPHONE ENCOUNTER
Patient called and stated she things she has a yeast infection she is wanting to see if something could be called in for her. Patient stated she does not want to come into the office.    Patient call back [phone]    [facility]    Please advise

## 2020-03-26 NOTE — TELEPHONE ENCOUNTER
Spoke to patient she stated that she has not tried any otc medication yet. She did go to the [facility] a couple weeks ago for acute pharyngitis and was rx'd abx. Patient states that she has a lot of itching and burning. She would like to see if you could call her in some diflucan to [facility] in [facility].

## 2020-04-17 ENCOUNTER — TELEPHONE (OUTPATIENT)
Dept: INTERNAL MEDICINE | Facility: CLINIC | Age: 25
End: 2020-04-17

## 2020-04-17 ENCOUNTER — TELEMEDICINE (OUTPATIENT)
Dept: INTERNAL MEDICINE | Facility: CLINIC | Age: 25
End: 2020-04-17

## 2020-04-17 VITALS — WEIGHT: 135 LBS | BODY MASS INDEX: 22.47 KG/M2

## 2020-04-17 DIAGNOSIS — S76.302A LEFT HAMSTRING INJURY, INITIAL ENCOUNTER: ICD-10-CM

## 2020-04-17 DIAGNOSIS — R41.840 ATTENTION OR CONCENTRATION DEFICIT: Primary | ICD-10-CM

## 2020-04-17 PROCEDURE — 99213 OFFICE O/P EST LOW 20 MIN: CPT | Performed by: INTERNAL MEDICINE

## 2020-04-17 RX ORDER — MELOXICAM 15 MG/1
15 TABLET ORAL DAILY PRN
Qty: 30 TABLET | Refills: 2 | Status: SHIPPED | OUTPATIENT
Start: 2020-04-17 | End: 2020-11-11

## 2020-04-17 RX ORDER — NORGESTIMATE AND ETHINYL ESTRADIOL 0.25-0.035
1 KIT ORAL DAILY
COMMUNITY
Start: 2020-02-01

## 2020-04-17 NOTE — PATIENT INSTRUCTIONS
For the hamstring injury, I recommend you continue NSAIDS (Meloxicam) for 2 full weeks, then as needed. Do not take Ibuprofen or Aleve while on the Meloxicam.  May take Tylenol.  Continue heat or ice, 2-3 times daily.  Please call if no better in 2 weeks.

## 2020-04-17 NOTE — PROGRESS NOTES
Subjective       Loyda Richmond is a 25 y.o. female.     Chief Complaint   Patient presents with   • ADD Follow up       History obtained from the patient.    You have chosen to receive care through a telehealth visit.  Do you consent to use a video/audio connection for your medical care today? Yes    History of Present Illness     Of note, the patient injured her left hamstring yesterday.  She states she heard a pop.  She has been taking Ibuprofen and using heat or ice, as well as trying to rest it.    ADD Follow-Up: The patient's ADHD subtype is the predominantly inattentive type. Her ADD has been well controlled since the last visit.   Interval Events:  Vyvanse was re-started last visit. She was previously on Vyvanse 50 mg daily, but was re-started at 30 mg daily, due to insomnia with the higher dose.  This is working well for her.  She does not take it every day.  Symptoms:  Stable difficulty concentrating.  Denies memory loss.  No hyperactivity, restlessness, impulsivity.  Medication: Vyvanse  Side Effects: None.    Current Outpatient Medications on File Prior to Visit   Medication Sig Dispense Refill   • norgestimate-ethinyl estradiol (Sprintec 28) 0.25-35 MG-MCG per tablet Take 1 tablet by mouth Daily.     • albuterol (PROVENTIL HFA;VENTOLIN HFA) 108 (90 Base) MCG/ACT inhaler Inhale 2 puffs Every 4 (Four) Hours As Needed for Wheezing. 1 inhaler 2   • cetirizine (zyrTEC) 10 MG tablet Take 10 mg by mouth Daily.     • fluticasone (FLONASE) 50 MCG/ACT nasal spray into each nostril.     • montelukast (SINGULAIR) 10 MG tablet TAKE ONE TABLET BY MOUTH DAILY 30 tablet 10   • [DISCONTINUED] amoxicillin (AMOXIL) 875 MG tablet 1 po bid for 10 days 20 tablet 0   • [DISCONTINUED] cefdinir (OMNICEF) 300 MG capsule Take 1 capsule by mouth 2 (Two) Times a Day. 20 capsule 0   • [DISCONTINUED] lisdexamfetamine (VYVANSE) 30 MG capsule Take 1 capsule by mouth Every Morning 90 capsule 0   • [DISCONTINUED] Norethin-Eth Estrad-Fe  Biphas (LO LOESTRIN FE) 1 MG-10 MCG / 10 MCG tablet Take  by mouth Daily.       No current facility-administered medications on file prior to visit.        Current outpatient and discharge medications have been reconciled for the patient.  Reviewed by: Yoanna Dejesus MD        The following portions of the patient's history were reviewed and updated as appropriate: allergies, current medications, past family history, past medical history, past social history, past surgical history and problem list.    Review of Systems   Constitutional: Negative for unexpected weight change.   Respiratory: Negative for cough and shortness of breath.         Denies breast pain, breast mass, nipple discharge, or nipple inversion.   Cardiovascular: Negative for chest pain.   Gastrointestinal: Negative for abdominal pain, blood in stool, constipation, diarrhea, nausea and vomiting.        Denies melena.   Genitourinary: Negative for dyspareunia, dysuria, frequency, hematuria, menstrual problem, pelvic pain, urgency, vaginal bleeding, vaginal discharge and vaginal pain.        Denies nocturia, incontinence, and incomplete emptying.   Skin: Negative for rash.   Hematological: Negative for adenopathy.         Objective       Weight 61.2 kg (135 lb), not currently breastfeeding.      Physical Exam   Constitutional: She appears well-developed and well-nourished.   Neurological: She is alert.   Psychiatric: She has a normal mood and affect.   Vitals reviewed.      Assessment / Plan:  Loyda was seen today for add.    Diagnoses and all orders for this visit:    Attention or concentration deficit  -     lisdexamfetamine (Vyvanse) 30 MG capsule; Take 1 capsule by mouth Every Morning (refill)   Continue current medication(s) as noted in the history of present illness.    Left hamstring injury, initial encounter  -     meloxicam (MOBIC) 15 MG tablet; Take 1 tablet by mouth Daily As Needed for Moderate Pain .   Recommended she continue NSAIDS  (Meloxicam) for 2 full weeks, then as needed. She is to hold  Ibuprofen and Aleve while on the Meloxicam.  May take Tylenol.  Continue heat or ice, 2-3 times daily.  She agrees to  call if no better in 2 weeks.    The patient was instructed in the side effects of the medication.  Risks of the potential for tolerance, dependence, and addiction were discussed.  The patient was instructed to take the lowest dosage of the medication, at the lowest frequency, and for the shortest period of time possible.  The patient was instructed not to receive controlled substances or narcotics from other doctors, and not to giveaway or sell the medication.     The patient was instructed to abstain from illicit drug use.  The patient was instructed to avoid alcohol while taking these medications.       Narcotics/controlled substance agreement, Charles report, and Urine Drug Screen were updated today if needed.      Return in about 3 months (around 7/17/2020) for Recheck ADD (in office).

## 2020-04-29 ENCOUNTER — TELEPHONE (OUTPATIENT)
Dept: INTERNAL MEDICINE | Facility: CLINIC | Age: 25
End: 2020-04-29

## 2020-07-17 ENCOUNTER — TELEMEDICINE (OUTPATIENT)
Dept: INTERNAL MEDICINE | Facility: CLINIC | Age: 25
End: 2020-07-17

## 2020-07-17 VITALS — WEIGHT: 135 LBS | BODY MASS INDEX: 22.47 KG/M2

## 2020-07-17 DIAGNOSIS — R41.840 ATTENTION OR CONCENTRATION DEFICIT: ICD-10-CM

## 2020-07-17 PROCEDURE — 99213 OFFICE O/P EST LOW 20 MIN: CPT | Performed by: INTERNAL MEDICINE

## 2020-07-17 NOTE — PROGRESS NOTES
Subjective       Loyda Richmond is a 25 y.o. female.     Chief Complaint   Patient presents with   • ADHD     follow up       History obtained from the patient.    You have chosen to receive care through a telehealth visit.  Do you consent to use a video/audio connection for your medical care today? Yes      History of Present Illness     ADD Follow-Up: The patient is here for follow up of ADD, which has been stable.  subtype is the predominantly inattentive type. Her ADD has been well controlled since the last visit.   Interval Events: None.  Symptoms:  Stable difficulty concentrating.  Denies memory loss.  No hyperactivity, restlessness, impulsivity.  Medication: Vyvanse  Side Effects: None.    The patient is taking the following controlled substance(s) on a long term (greater than three months) basis: Lisdexamfetamine (Vyvanse).  She is taking controlled substances for other (ADHD).  A history was obtained from the patient and the following were reviewed: family history, social history, psychiatric history, and substance abuse history.  A baseline assessment was performed and includes a controlled substance agreement, urine drug screen, SAMANTHA (within 12 months prior to today's encounter), and a physical exam, if appropriate, was performed within the past year.  It is medically appropriate to prescribe her the medication(s) above.  If applicable, prior treatment records were obtained and reviewed to justify long term prescribing of controlled substances.  The patient was educated on the following: Limited use of the medication, need to discontinue the medication when her condition resolves, proper storage and disposal of medication(s), and risks and dangers associated with controlled substances including tolerance, dependence, and addiction.  A written informed consent was obtained and includes objectives of treatment, further diagnostic testing if appropriate, and an exit strategy for discontinuing the medication  on the condition resolves, if appropriate.  In addition, if appropriate, the patient will be screened for other medical conditions that may impact the prescribing of controlled substances.  Previous treatments have been tried including trials of noncontrolled substances or lower doses of controlled substances.  The controlled medication(s) have been titrated to the level appropriate and necessary and the medication(s) are not causing unacceptable side effects.        Current Outpatient Medications on File Prior to Visit   Medication Sig Dispense Refill   • albuterol (PROVENTIL HFA;VENTOLIN HFA) 108 (90 Base) MCG/ACT inhaler Inhale 2 puffs Every 4 (Four) Hours As Needed for Wheezing. 1 inhaler 2   • cetirizine (zyrTEC) 10 MG tablet Take 10 mg by mouth Daily.     • fluticasone (FLONASE) 50 MCG/ACT nasal spray into each nostril.     • meloxicam (MOBIC) 15 MG tablet Take 1 tablet by mouth Daily As Needed for Moderate Pain . 30 tablet 2   • montelukast (SINGULAIR) 10 MG tablet TAKE ONE TABLET BY MOUTH DAILY 30 tablet 10   • norgestimate-ethinyl estradiol (Sprintec 28) 0.25-35 MG-MCG per tablet Take 1 tablet by mouth Daily.       No current facility-administered medications on file prior to visit.        Current outpatient and discharge medications have been reconciled for the patient.  Reviewed by: Yoanna Dejesus MD        The following portions of the patient's history were reviewed and updated as appropriate: allergies, current medications, past family history, past medical history, past social history, past surgical history and problem list.    Review of Systems   Constitutional: Negative for appetite change, fatigue and unexpected weight change.   Respiratory: Negative for shortness of breath.    Cardiovascular: Negative for chest pain and palpitations.   Gastrointestinal: Negative for abdominal pain, diarrhea, nausea and vomiting.   Neurological: Negative for headaches.        No tics.  No memory loss.    Psychiatric/Behavioral: Positive for decreased concentration. Negative for agitation, confusion, sleep disturbance and suicidal ideas. The patient is not nervous/anxious.         No depression.         Objective       Weight 61.2 kg (135 lb), not currently breastfeeding.      Physical Exam   Constitutional: She appears well-developed and well-nourished.   Cardiovascular: Normal rate, regular rhythm and normal heart sounds.   No murmur heard.  Pulmonary/Chest: Effort normal and breath sounds normal.   Neurological: She is alert.   Psychiatric: She has a normal mood and affect.   Nursing note and vitals reviewed.      Assessment / Plan:  Loyda was seen today for adhd.    Diagnoses and all orders for this visit:    Attention or concentration deficit  -     lisdexamfetamine (Vyvanse) 30 MG capsule; Take 1 capsule by mouth Every Morning        The patient was instructed in the side effects of the medication.  Risks of the potential for tolerance, dependence, and addiction were discussed.  The patient was instructed to take the lowest dosage of the medication, at the lowest frequency, and for the shortest period of time possible.  The patient was instructed not to receive controlled substances or narcotics from other doctors, and not to giveaway or sell the medication.    The patient was instructed to abstain from illicit drug use.  The patient was instructed to avoid alcohol while taking these medications.      Narcotics/controlled substance agreement, Charles report, and Urine Drug Screen were updated today if needed.    Return in about 3 months (around 10/17/2020) for Recheck ADD, non-fasting.

## 2020-11-02 DIAGNOSIS — R41.840 ATTENTION OR CONCENTRATION DEFICIT: ICD-10-CM

## 2020-11-02 NOTE — TELEPHONE ENCOUNTER
PATIENT CALLED AND SAID SHE NEEDED A 90 DAY REFILL ON VYVANSE 30 MG 1 DAILY    [facility]

## 2020-11-03 NOTE — TELEPHONE ENCOUNTER
Prescription sent to the pharmacy, but the patient is past due for her ADD follow-up.  Please schedule.

## 2020-11-09 DIAGNOSIS — J30.89 NON-SEASONAL ALLERGIC RHINITIS DUE TO OTHER ALLERGIC TRIGGER: Primary | ICD-10-CM

## 2020-11-09 DIAGNOSIS — R06.2 WHEEZING: ICD-10-CM

## 2020-11-09 RX ORDER — ALBUTEROL SULFATE 90 UG/1
2 AEROSOL, METERED RESPIRATORY (INHALATION) EVERY 4 HOURS PRN
Qty: 18 G | Refills: 5 | Status: SHIPPED | OUTPATIENT
Start: 2020-11-09

## 2020-11-09 NOTE — TELEPHONE ENCOUNTER
Caller: Loyda Richmond    Relationship: Self    Best call back number: [phone]    Medication needed:   Requested Prescriptions     Pending Prescriptions Disp Refills   • albuterol sulfate  (90 Base) MCG/ACT inhaler       Sig: Inhale 2 puffs Every 4 (Four) Hours As Needed for Wheezing.       When do you need the refill by: ASAP    What details did the patient provide when requesting the medication:     Does the patient have less than a 3 day supply:  [x] Yes  [] No    What is the patient's preferred pharmacy:      [facility]/pharmacy #[id number] [facility] [address] [facility] [phone] [facility] [phone]

## 2020-11-09 NOTE — TELEPHONE ENCOUNTER
Prescription was sent to the pharmacy.    The patient was seen last by telehealth in July 2020.  She was due last month for ADD follow-up.  Please schedule.

## 2020-11-11 ENCOUNTER — OFFICE VISIT (OUTPATIENT)
Dept: INTERNAL MEDICINE | Facility: CLINIC | Age: 25
End: 2020-11-11

## 2020-11-11 VITALS
TEMPERATURE: 97.1 F | DIASTOLIC BLOOD PRESSURE: 74 MMHG | WEIGHT: 129.25 LBS | RESPIRATION RATE: 20 BRPM | SYSTOLIC BLOOD PRESSURE: 124 MMHG | BODY MASS INDEX: 21.51 KG/M2 | HEART RATE: 72 BPM

## 2020-11-11 DIAGNOSIS — R41.840 ATTENTION OR CONCENTRATION DEFICIT: Primary | ICD-10-CM

## 2020-11-11 PROCEDURE — 99213 OFFICE O/P EST LOW 20 MIN: CPT | Performed by: INTERNAL MEDICINE

## 2020-11-11 RX ORDER — IPRATROPIUM BROMIDE 42 UG/1
2 SPRAY, METERED NASAL AS NEEDED
COMMUNITY

## 2020-11-11 NOTE — PROGRESS NOTES
Subjective       Loyda Richmond is a 25 y.o. female.     No chief complaint on file.      History obtained from the patient.      History of Present Illness     ADD Follow-Up: The patient is here for follow up of ADD, which has been stable.   Interval Events: None.  Symptoms:  Stable difficulty concentrating.  Denies memory loss.  No hyperactivity, restlessness, impulsivity.  Medication: Vyvanse  Side Effects: None.     The patient is taking the following controlled substance(s) on a long term (greater than three months) basis: Lisdexamfetamine (Vyvanse).  She is taking controlled substances for other (ADHD).  A history was obtained from the patient and the following were reviewed: family history, social history, psychiatric history, and substance abuse history.  A baseline assessment was performed and includes a controlled substance agreement, urine drug screen, SAMANTHA (within 12 months prior to today's encounter), and a physical exam, if appropriate, was performed within the past year.  It is medically appropriate to prescribe her the medication(s) above.  If applicable, prior treatment records were obtained and reviewed to justify long term prescribing of controlled substances.  The patient was educated on the following: Limited use of the medication, need to discontinue the medication when her condition resolves, proper storage and disposal of medication(s), and risks and dangers associated with controlled substances including tolerance, dependence, and addiction.  A written informed consent was obtained and includes objectives of treatment, further diagnostic testing if appropriate, and an exit strategy for discontinuing the medication on the condition resolves, if appropriate.  In addition, if appropriate, the patient will be screened for other medical conditions that may impact the prescribing of controlled substances.  Previous treatments have been tried including trials of noncontrolled substances  or lower doses of controlled substances.  The controlled medication(s) have been titrated to the level appropriate and necessary and the medication(s) are not causing unacceptable side effects.      Current Outpatient Medications on File Prior to Visit   Medication Sig Dispense Refill   • albuterol sulfate  (90 Base) MCG/ACT inhaler Inhale 2 puffs Every 4 (Four) Hours As Needed for Wheezing. 18 g 5   • fluticasone-salmeterol (Wixela Inhub) 250-50 MCG/DOSE DISKUS Inhale 2 (Two) Times a Day.     • ipratropium (ATROVENT) 0.06 % nasal spray 2 sprays into the nostril(s) as directed by provider As Needed for Rhinitis.     • lisdexamfetamine (Vyvanse) 30 MG capsule Take 1 capsule by mouth Every Morning 90 capsule 0   • montelukast (SINGULAIR) 10 MG tablet TAKE ONE TABLET BY MOUTH DAILY 30 tablet 10   • norgestimate-ethinyl estradiol (Sprintec 28) 0.25-35 MG-MCG per tablet Take 1 tablet by mouth Daily.     • [DISCONTINUED] cetirizine (zyrTEC) 10 MG tablet Take 10 mg by mouth Daily.     • [DISCONTINUED] fluticasone (FLONASE) 50 MCG/ACT nasal spray into each nostril.     • [DISCONTINUED] meloxicam (MOBIC) 15 MG tablet Take 1 tablet by mouth Daily As Needed for Moderate Pain . 30 tablet 2     No current facility-administered medications on file prior to visit.        Current outpatient and discharge medications have been reconciled for the patient.  Reviewed by: Yoanna Dejesus MD        The following portions of the patient's history were reviewed and updated as appropriate: allergies, current medications, past family history, past medical history, past social history, past surgical history and problem list.    Review of Systems   Constitutional: Negative for appetite change, fatigue and unexpected weight change.   Respiratory: Negative for shortness of breath.    Cardiovascular: Negative for chest pain and palpitations.   Gastrointestinal: Negative for abdominal pain, diarrhea, nausea and vomiting.   Neurological:  Negative for headaches.        No tics.  No memory loss.   Psychiatric/Behavioral: Positive for decreased concentration. Negative for agitation, confusion, sleep disturbance and suicidal ideas. The patient is not nervous/anxious.         No depression.         Objective       Blood pressure 124/74, pulse 72, temperature 97.1 °F (36.2 °C), temperature source Temporal, resp. rate 20, weight 58.6 kg (129 lb 4 oz), not currently breastfeeding.      Physical Exam  Vitals signs and nursing note reviewed.   Constitutional:       Appearance: She is well-developed and normal weight.   Cardiovascular:      Rate and Rhythm: Normal rate and regular rhythm.      Heart sounds: Normal heart sounds. No murmur.   Pulmonary:      Effort: Pulmonary effort is normal.      Breath sounds: Normal breath sounds.   Neurological:      Mental Status: She is alert.   Psychiatric:         Mood and Affect: Mood normal.         Assessment / Plan:  Diagnoses and all orders for this visit:    1. Attention or concentration deficit (Primary)     Continue current medication(s) as noted in the history of present illness.    The patient was instructed in the side effects of the medication.  Risks of the potential for tolerance, dependence, and addiction were discussed.  The patient was instructed to take the lowest dosage of the medication, at the lowest frequency, and for the shortest period of time possible.  The patient was instructed not to receive controlled substances or narcotics from other doctors, and not to giveaway or sell the medication.    The patient was instructed to abstain from illicit drug use.  The patient was instructed to avoid alcohol while taking these medications.      Narcotics/controlled substance agreement, Charles report, and Urine Drug Screen were updated today if needed.      Return in about 3 months (around 2/11/2021) for Annual physical, fasting.

## 2020-11-30 DIAGNOSIS — N76.0 ACUTE VAGINITIS: Primary | ICD-10-CM

## 2020-11-30 RX ORDER — FLUCONAZOLE 150 MG/1
150 TABLET ORAL ONCE
Qty: 2 TABLET | Refills: 0 | Status: SHIPPED | OUTPATIENT
Start: 2020-11-30 | End: 2020-11-30

## 2021-01-25 ENCOUNTER — TELEPHONE (OUTPATIENT)
Dept: INTERNAL MEDICINE | Facility: CLINIC | Age: 26
End: 2021-01-25

## 2021-01-25 DIAGNOSIS — Z00.00 ROUTINE ADULT HEALTH MAINTENANCE: Primary | ICD-10-CM

## 2021-02-12 ENCOUNTER — OFFICE VISIT (OUTPATIENT)
Dept: INTERNAL MEDICINE | Facility: CLINIC | Age: 26
End: 2021-02-12

## 2021-02-12 VITALS
HEART RATE: 79 BPM | WEIGHT: 129 LBS | BODY MASS INDEX: 21.49 KG/M2 | DIASTOLIC BLOOD PRESSURE: 70 MMHG | SYSTOLIC BLOOD PRESSURE: 104 MMHG | TEMPERATURE: 98.6 F | HEIGHT: 65 IN | OXYGEN SATURATION: 98 % | RESPIRATION RATE: 20 BRPM

## 2021-02-12 DIAGNOSIS — Z00.00 ENCOUNTER FOR HEALTH MAINTENANCE EXAMINATION IN ADULT: Primary | ICD-10-CM

## 2021-02-12 DIAGNOSIS — Z13.6 SCREENING FOR CARDIOVASCULAR CONDITION: ICD-10-CM

## 2021-02-12 DIAGNOSIS — Z79.899 HIGH RISK MEDICATION USE: ICD-10-CM

## 2021-02-12 DIAGNOSIS — R41.840 ATTENTION OR CONCENTRATION DEFICIT: ICD-10-CM

## 2021-02-12 LAB
25(OH)D3 SERPL-MCNC: 42.1 NG/ML (ref 30–100)
ALBUMIN SERPL-MCNC: 4.5 G/DL (ref 3.5–5.2)
ALBUMIN/GLOB SERPL: 1.9 G/DL
ALP SERPL-CCNC: 40 U/L (ref 39–117)
ALT SERPL W P-5'-P-CCNC: 9 U/L (ref 1–33)
ANION GAP SERPL CALCULATED.3IONS-SCNC: 8.2 MMOL/L (ref 5–15)
AST SERPL-CCNC: 21 U/L (ref 1–32)
BASOPHILS # BLD AUTO: 0.06 10*3/MM3 (ref 0–0.2)
BASOPHILS NFR BLD AUTO: 0.9 % (ref 0–1.5)
BILIRUB SERPL-MCNC: 0.4 MG/DL (ref 0–1.2)
BUN SERPL-MCNC: 9 MG/DL (ref 6–20)
BUN/CREAT SERPL: 10 (ref 7–25)
CALCIUM SPEC-SCNC: 9.5 MG/DL (ref 8.6–10.5)
CHLORIDE SERPL-SCNC: 102 MMOL/L (ref 98–107)
CHOLEST SERPL-MCNC: 153 MG/DL (ref 0–200)
CO2 SERPL-SCNC: 25.8 MMOL/L (ref 22–29)
CREAT SERPL-MCNC: 0.9 MG/DL (ref 0.57–1)
DEPRECATED RDW RBC AUTO: 39.6 FL (ref 37–54)
EOSINOPHIL # BLD AUTO: 0.28 10*3/MM3 (ref 0–0.4)
EOSINOPHIL NFR BLD AUTO: 4.2 % (ref 0.3–6.2)
ERYTHROCYTE [DISTWIDTH] IN BLOOD BY AUTOMATED COUNT: 12 % (ref 12.3–15.4)
GFR SERPL CREATININE-BSD FRML MDRD: 76 ML/MIN/1.73
GLOBULIN UR ELPH-MCNC: 2.4 GM/DL
GLUCOSE SERPL-MCNC: 75 MG/DL (ref 65–99)
HCT VFR BLD AUTO: 42.3 % (ref 34–46.6)
HDLC SERPL-MCNC: 68 MG/DL (ref 40–60)
HGB BLD-MCNC: 13.8 G/DL (ref 12–15.9)
IMM GRANULOCYTES # BLD AUTO: 0.03 10*3/MM3 (ref 0–0.05)
IMM GRANULOCYTES NFR BLD AUTO: 0.4 % (ref 0–0.5)
LDLC SERPL CALC-MCNC: 68 MG/DL (ref 0–100)
LDLC/HDLC SERPL: 0.98 {RATIO}
LYMPHOCYTES # BLD AUTO: 1.84 10*3/MM3 (ref 0.7–3.1)
LYMPHOCYTES NFR BLD AUTO: 27.5 % (ref 19.6–45.3)
MCH RBC QN AUTO: 29.6 PG (ref 26.6–33)
MCHC RBC AUTO-ENTMCNC: 32.6 G/DL (ref 31.5–35.7)
MCV RBC AUTO: 90.6 FL (ref 79–97)
MONOCYTES # BLD AUTO: 0.56 10*3/MM3 (ref 0.1–0.9)
MONOCYTES NFR BLD AUTO: 8.4 % (ref 5–12)
NEUTROPHILS NFR BLD AUTO: 3.91 10*3/MM3 (ref 1.7–7)
NEUTROPHILS NFR BLD AUTO: 58.6 % (ref 42.7–76)
NRBC BLD AUTO-RTO: 0 /100 WBC (ref 0–0.2)
PLATELET # BLD AUTO: 296 10*3/MM3 (ref 140–450)
PMV BLD AUTO: 9.9 FL (ref 6–12)
POTASSIUM SERPL-SCNC: 4.1 MMOL/L (ref 3.5–5.2)
PROT SERPL-MCNC: 6.9 G/DL (ref 6–8.5)
RBC # BLD AUTO: 4.67 10*6/MM3 (ref 3.77–5.28)
SODIUM SERPL-SCNC: 136 MMOL/L (ref 136–145)
TRIGL SERPL-MCNC: 91 MG/DL (ref 0–150)
TSH SERPL DL<=0.05 MIU/L-ACNC: 1.32 UIU/ML (ref 0.27–4.2)
VLDLC SERPL-MCNC: 17 MG/DL (ref 5–40)
WBC # BLD AUTO: 6.68 10*3/MM3 (ref 3.4–10.8)

## 2021-02-12 PROCEDURE — 80053 COMPREHEN METABOLIC PANEL: CPT | Performed by: INTERNAL MEDICINE

## 2021-02-12 PROCEDURE — 36415 COLL VENOUS BLD VENIPUNCTURE: CPT | Performed by: INTERNAL MEDICINE

## 2021-02-12 PROCEDURE — 84443 ASSAY THYROID STIM HORMONE: CPT | Performed by: INTERNAL MEDICINE

## 2021-02-12 PROCEDURE — 99395 PREV VISIT EST AGE 18-39: CPT | Performed by: INTERNAL MEDICINE

## 2021-02-12 PROCEDURE — 82306 VITAMIN D 25 HYDROXY: CPT | Performed by: INTERNAL MEDICINE

## 2021-02-12 PROCEDURE — 86481 TB AG RESPONSE T-CELL SUSP: CPT | Performed by: INTERNAL MEDICINE

## 2021-02-12 PROCEDURE — 85025 COMPLETE CBC W/AUTO DIFF WBC: CPT | Performed by: INTERNAL MEDICINE

## 2021-02-12 PROCEDURE — 80061 LIPID PANEL: CPT | Performed by: INTERNAL MEDICINE

## 2021-02-12 NOTE — PROGRESS NOTES
Subjective     Chief Complaint:  Physical Exam.    History of Present Illness    History obtained from the patient.    ADD Follow-Up: The patient is here for follow up of ADD, which has been stable.   Interval Events: None.  Symptoms:  Stable difficulty concentrating.  Denies memory loss.  No hyperactivity, restlessness, impulsivity.  Medication: Vyvanse  Side Effects: None.     The patient is taking the following controlled substance(s) on a long term (greater than three months) basis: Lisdexamfetamine (Vyvanse).  She is taking controlled substances for other (ADHD).  A history was obtained from the patient and the following were reviewed: family history, social history, psychiatric history, and substance abuse history.  A baseline assessment was performed and includes a controlled substance agreement, urine drug screen, SAMANTHA (within 12 months prior to today's encounter), and a physical exam, if appropriate, was performed within the past year.  It is medically appropriate to prescribe her the medication(s) above.  If applicable, prior treatment records were obtained and reviewed to justify long term prescribing of controlled substances.  The patient was educated on the following: Limited use of the medication, need to discontinue the medication when her condition resolves, proper storage and disposal of medication(s), and risks and dangers associated with controlled substances including tolerance, dependence, and addiction.  A written informed consent was obtained and includes objectives of treatment, further diagnostic testing if appropriate, and an exit strategy for discontinuing the medication on the condition resolves, if appropriate.  In addition, if appropriate, the patient will be screened for other medical conditions that may impact the prescribing of controlled substances.  Previous treatments have been tried including trials of noncontrolled substances or lower doses of controlled substances.   The controlled medication(s) have been titrated to the level appropriate and necessary and the medication(s) are not causing unacceptable side effects.        Loyda Richmond is a 25 y.o. female who presents for an Annual Physical.      PMH, PSH, SocHx, FamHx, Allergies, and Medications: Reviewed and updated.    Outpatient Medications Prior to Visit   Medication Sig Dispense Refill   • albuterol sulfate  (90 Base) MCG/ACT inhaler Inhale 2 puffs Every 4 (Four) Hours As Needed for Wheezing. 18 g 5   • fluticasone-salmeterol (Wixela Inhub) 250-50 MCG/DOSE DISKUS Inhale 2 (Two) Times a Day.     • ipratropium (ATROVENT) 0.06 % nasal spray 2 sprays into the nostril(s) as directed by provider As Needed for Rhinitis.     • montelukast (SINGULAIR) 10 MG tablet TAKE ONE TABLET BY MOUTH DAILY 30 tablet 10   • norgestimate-ethinyl estradiol (Sprintec 28) 0.25-35 MG-MCG per tablet Take 1 tablet by mouth Daily.     • lisdexamfetamine (Vyvanse) 30 MG capsule Take 1 capsule by mouth Every Morning 90 capsule 0     No facility-administered medications prior to visit.        Immunization History   Administered Date(s) Administered   • DTaP, Unspecified 03/30/1998   • Flu Vaccine Quad PF >36MO 10/15/2018   • Flulaval/Fluarix/Fluzone Quad 10/02/2019, 10/09/2020   • HPV Quadrivalent 07/24/2007, 09/28/2007, 01/31/2008   • Hepatitis A 07/12/2006, 07/24/2007   • Hepatitis B 1994, 1994, 1994   • MMR 03/30/1998   • Meningococcal Conjugate 07/11/2005, 08/03/2011   • OPV 03/30/1998   • Tdap 07/11/2005, 07/30/2009, 10/02/2019   • Varicella 01/30/1998         Patient Active Problem List   Diagnosis   • Acne   • Allergic rhinitis   • Attention or concentration deficit   • Disorder of vocal cord   • Low back pain   • Chronic diarrhea       Health Habits:  Dental Exam. up to date  Eye Exam. up to date  Hearing Loss:  No  Exercise: 5 times/week.  Current exercise activities include: HIT  Diet: Healthy  Multivitamin:  No    Safe Driving:  Yes  Seat Belt:  Yes  Bike Helmet:  N/A  Skin Screening:  Yes  Sunscreen: Yes  SBE / GEMMA: Yes, every few months  Sexual Activity: Yes  Birth Control:  ocp  STD Prevention:  None (longterm monogamous relationship)    Last Pap: August 2020, normal per patient report (GYN).  Last Mammogram:  N/A  Last DEXA Scan: N/A  Last Colonoscopy: N/A  Last PSA: N/A    Social:    Social History     Socioeconomic History   • Marital status: Single     Spouse name: Not on file   • Number of children: 0   • Years of education: Not on file   • Highest education level: Not on file   Occupational History   • Occupation: Marketing     Comment: full time   Tobacco Use   • Smoking status: Never Smoker   • Smokeless tobacco: Never Used   Substance and Sexual Activity   • Alcohol use: Yes     Comment: 1 drinks (beer or wine) 1 night per week   • Drug use: No   • Sexual activity: Yes     Partners: Male     Birth control/protection: OCP         Current Medical Providers:    Yoanna Dejesus MD (Internal Medicine / Pediatrics)    The [facility] providers who are involved in the care of this patient are listed above.         Review of Systems   Constitutional: Negative for chills, fatigue, fever and unexpected weight change (decreased 11 pounds in 1 year, intentional).        No night sweats.    HENT: Positive for nosebleeds (occasional from nasal spray) and sore throat (scratchy). Negative for congestion, ear pain, hearing loss, postnasal drip, rhinorrhea, sinus pressure, sinus pain, sneezing, tinnitus and voice change.         Denies snoring.   Eyes: Positive for itching. Negative for photophobia, pain, discharge, redness and visual disturbance.   Respiratory: Negative for cough, chest tightness, shortness of breath, wheezing and stridor.         No chest congestion.  No hemoptysis.   Cardiovascular: Negative for chest pain, palpitations and leg swelling.        No orthopnea, GAYTAN, or PND.  No claudication or syncope.  "  Gastrointestinal: Positive for diarrhea (chronic). Negative for abdominal pain, blood in stool, constipation, nausea, rectal pain and vomiting.        Chronic belching.  No melena.  No hematemesis.  No heartburn, dysphagia or odynophagia.  No early satiety or bloating.    Endocrine: Negative for cold intolerance, heat intolerance, polydipsia, polyphagia and polyuria.        No hair loss or dry skin.  No hot flashes.     Genitourinary: Negative for difficulty urinating, dyspareunia, dysuria, flank pain, frequency, hematuria, menstrual problem, pelvic pain, urgency, vaginal bleeding and vaginal discharge.        No nocturia, incomplete emptying, or incontinence.   Musculoskeletal: Negative for arthralgias, back pain, gait problem, joint swelling, myalgias, neck pain and neck stiffness.        No joint stiffness.   Skin: Negative for rash.        No new skin lesions or changes in skin lesions. No breast pain or masses.  No nipple discharge, but has a left nipple inversion- chronic  .   Allergic/Immunologic: Positive for environmental allergies (startes allergy shots).   Neurological: Negative for dizziness, tremors, syncope, speech difficulty, weakness, light-headedness, numbness and headaches.        No tingling.  No memory loss.  No decreased concentration.   Hematological: Negative for adenopathy. Does not bruise/bleed easily.   Psychiatric/Behavioral: Positive for dysphoric mood (since Jan 2021, has an appt with a therapist next week). Negative for confusion, sleep disturbance and suicidal ideas. The patient is nervous/anxious (COVID and weather).         No depression.           Objective     Vitals:    02/12/21 0821   BP: 104/70   Pulse: 79   Resp: 20   Temp: 98.6 °F (37 °C)   TempSrc: Temporal   SpO2: 98%   Weight: 58.5 kg (129 lb)   Height: 165.1 cm (65\")   PainSc: 0-No pain       Body mass index is 21.47 kg/m².    Physical Exam  Vitals signs and nursing note reviewed.   Constitutional:       Appearance: " Normal appearance. She is well-developed and normal weight.   HENT:      Head: Normocephalic and atraumatic.      Right Ear: Tympanic membrane, ear canal and external ear normal.      Left Ear: Tympanic membrane, ear canal and external ear normal.      Mouth/Throat:      Mouth: Mucous membranes are moist. No oral lesions.      Pharynx: Oropharynx is clear.      Comments: Tonsils normal.  Eyes:      Extraocular Movements: Extraocular movements intact.      Conjunctiva/sclera: Conjunctivae normal.      Pupils: Pupils are equal, round, and reactive to light.      Comments: Fundi normal bilaterally.   Neck:      Musculoskeletal: Normal range of motion and neck supple.      Thyroid: No thyromegaly.      Vascular: No carotid bruit.   Cardiovascular:      Rate and Rhythm: Normal rate and regular rhythm.      Pulses: Normal pulses.      Heart sounds: Normal heart sounds. No murmur. No friction rub. No gallop.    Pulmonary:      Effort: Pulmonary effort is normal.      Breath sounds: Normal breath sounds.   Chest:      Breasts:         Right: No inverted nipple, mass, nipple discharge, skin change or tenderness.         Left: Inverted nipple present. No mass, nipple discharge, skin change or tenderness.   Abdominal:      General: Bowel sounds are normal. There is no distension or abdominal bruit.      Palpations: Abdomen is soft. There is no hepatomegaly, splenomegaly or mass.      Tenderness: There is no abdominal tenderness.   Genitourinary:     Comments:  and rectal exam deferred.  Musculoskeletal: Normal range of motion.      Right lower leg: No edema.      Left lower leg: No edema.   Lymphadenopathy:      Cervical: No cervical adenopathy.      Upper Body:      Right upper body: No supraclavicular or axillary adenopathy.      Left upper body: No supraclavicular or axillary adenopathy.   Skin:     Findings: No rash.      Comments: No atypical skin lesions.   Neurological:      Mental Status: She is alert.      Cranial  Nerves: Cranial nerves are intact.      Motor: Motor function is intact. No abnormal muscle tone.      Coordination: Coordination is intact.      Gait: Gait is intact.      Deep Tendon Reflexes: Reflexes are normal and symmetric.   Psychiatric:         Mood and Affect: Mood normal.         PHQ-2 Depression Screening  Little interest or pleasure in doing things? 0   Feeling down, depressed, or hopeless? 0   PHQ-2 Total Score 0         Counseling was given to patient for the following topics:  appropriate exercise, healthy eating habits, disease prevention, risk factors for cancer, importance of self breast exam and breast health, sun safety, seatbelt use, safe driving and safe sex. Also discussed the importance of regular dental and vision care, as well recommendation for a yearly screening skin exam after age 40.  Written information provided to patient on these topics and other health maintenance issues.      Assessment/Plan       Diagnoses and all orders for this visit:    1. Encounter for health maintenance examination in adult (Primary)  -     Lipid Panel  -     Comprehensive Metabolic Panel  -     TSH  -     Vitamin D 25 Hydroxy  -     CBC & Differential  -     TSPOT  -     CBC Auto Differential    2. Attention or concentration deficit  -     lisdexamfetamine (Vyvanse) 30 MG capsule; Take 1 capsule by mouth Every Morning  Dispense: 90 capsule; Refill: 0 (REFILL)   Continue current medication(s) as noted in the history of present illness.    The patient was instructed in the side effects of the medication.  Risks of the potential for tolerance, dependence, and addiction were discussed.  The patient was instructed to take the lowest dosage of the medication, at the lowest frequency, and for the shortest period of time possible.  The patient was instructed not to receive controlled substances or narcotics from other doctors, and not to giveaway or sell the medication.    The patient was instructed to abstain from  illicit drug use.  The patient was instructed to avoid alcohol while taking these medications.      Narcotics/controlled substance agreement, Charles report, and Urine Drug Screen were updated today if needed.    3. High risk medication use  -     Urine Drug Screen - Urine, Clean Catch; Future    4. Screening for cardiovascular condition  -     Lipid Panel  -     Comprehensive Metabolic Panel  -     TSH  -     Vitamin D 25 Hydroxy  -     CBC & Differential  -     CBC Auto Differential      Return in about 3 months (around 5/12/2021) for Recheck ADD, non-fasting, video visit.

## 2021-02-14 LAB
TSPOT INTERPRETATION: NORMAL
TSPOT PANEL A: 0
TSPOT PANEL B: 0

## 2021-02-15 ENCOUNTER — TELEPHONE (OUTPATIENT)
Dept: INTERNAL MEDICINE | Facility: CLINIC | Age: 26
End: 2021-02-15

## 2021-02-15 ENCOUNTER — LAB (OUTPATIENT)
Dept: INTERNAL MEDICINE | Facility: CLINIC | Age: 26
End: 2021-02-15

## 2021-02-15 DIAGNOSIS — Z00.00 ENCOUNTER FOR HEALTH MAINTENANCE EXAMINATION IN ADULT: ICD-10-CM

## 2021-02-15 DIAGNOSIS — Z00.00 ENCOUNTER FOR HEALTH MAINTENANCE EXAMINATION IN ADULT: Primary | ICD-10-CM

## 2021-02-15 PROCEDURE — 36415 COLL VENOUS BLD VENIPUNCTURE: CPT | Performed by: INTERNAL MEDICINE

## 2021-02-15 PROCEDURE — 86481 TB AG RESPONSE T-CELL SUSP: CPT | Performed by: INTERNAL MEDICINE

## 2021-02-15 NOTE — TELEPHONE ENCOUNTER
Loyda notified of lab draw needed and CSA.  She will try and come in today for lab  Verbal understanding given

## 2021-02-15 NOTE — TELEPHONE ENCOUNTER
Call patient please.  It looks like her T spot results were invalid, and need to be repeated.  I have entered an order.  She can stop by the office, non-fasting, prior to 12 PM for the blood draw.      I also need her to sign a controlled substance agreement when she comes in, since I forgot to have her do that at her last visit.  I will leave it up front for her.

## 2021-02-17 ENCOUNTER — TELEPHONE (OUTPATIENT)
Dept: INTERNAL MEDICINE | Facility: CLINIC | Age: 26
End: 2021-02-17

## 2021-02-17 LAB
TSPOT INTERPRETATION: NEGATIVE
TSPOT NIL CONTROL INTERPRETATION: NORMAL
TSPOT PANEL A: 0
TSPOT PANEL B: 0
TSPOT POS CONTROL INTERPRETATION: NORMAL

## 2021-02-17 NOTE — TELEPHONE ENCOUNTER
Call patient please.  Her physical exam form is done.  Does she want to pick it up or have us fax it?  We do need a fax number if she wants it faxed.

## 2021-02-17 NOTE — TELEPHONE ENCOUNTER
PATIENT CALLED TO LET THE OFFICE KNOW THAT SHE WANTS THE PHYSICAL FORM FAXED TO [facility] FAX NUMBER -[phone]

## 2021-05-12 ENCOUNTER — TELEMEDICINE (OUTPATIENT)
Dept: INTERNAL MEDICINE | Facility: CLINIC | Age: 26
End: 2021-05-12

## 2021-05-12 VITALS — TEMPERATURE: 97.6 F | WEIGHT: 130 LBS | HEART RATE: 68 BPM | BODY MASS INDEX: 21.63 KG/M2

## 2021-05-12 DIAGNOSIS — R41.840 ATTENTION OR CONCENTRATION DEFICIT: ICD-10-CM

## 2021-05-12 PROCEDURE — 99213 OFFICE O/P EST LOW 20 MIN: CPT | Performed by: INTERNAL MEDICINE

## 2021-08-26 ENCOUNTER — TELEMEDICINE (OUTPATIENT)
Dept: INTERNAL MEDICINE | Facility: CLINIC | Age: 26
End: 2021-08-26

## 2021-08-26 VITALS — WEIGHT: 127 LBS | TEMPERATURE: 97 F | BODY MASS INDEX: 21.13 KG/M2

## 2021-08-26 DIAGNOSIS — R41.840 ATTENTION OR CONCENTRATION DEFICIT: Primary | ICD-10-CM

## 2021-08-26 PROCEDURE — 99213 OFFICE O/P EST LOW 20 MIN: CPT | Performed by: INTERNAL MEDICINE

## 2021-08-26 RX ORDER — SYRINGE WITH NEEDLE, 1 ML 28GX1/2"
SYRINGE, EMPTY DISPOSABLE MISCELLANEOUS SEE ADMIN INSTRUCTIONS
COMMUNITY
Start: 2021-05-21

## 2021-08-26 RX ORDER — CETIRIZINE HYDROCHLORIDE 10 MG/1
10 TABLET ORAL DAILY
COMMUNITY

## 2021-11-05 ENCOUNTER — TELEPHONE (OUTPATIENT)
Dept: INTERNAL MEDICINE | Facility: CLINIC | Age: 26
End: 2021-11-05

## 2021-11-05 NOTE — TELEPHONE ENCOUNTER
----- Message from Viviana Romero CMA sent at 11/5/2021  9:56 AM EDT -----  Regarding: FW: Covid rapid results verification    ----- Message -----  From: Loyda Richmond  Sent: 11/5/2021   9:01 AM EDT  To: Prakash Bernstein [facility]  Subject: Covid rapid results verification                 Hi Dr. Dejesus,    Can you please provide a note to accompany my rapid test results? My work is requiring this in order to come back to the office. I was exposed on Saturday 10/30 at an outdoor event and quarantined for 5 days, got tested on 11/4 and received negative results. I have not displayed any symptoms, am fully vaccinated, and everyone else at the outdoor event has tested negative. I have the email attached of my rapid results from [employer].     Thank you,  Loyda Richmond

## 2021-11-05 NOTE — TELEPHONE ENCOUNTER
Loyda notified the work excuse is printed and copy of covid 19 results.  She said she will  excuse on 11/8/2021   Placed in front office for  verbal understanding given

## 2021-11-30 ENCOUNTER — TELEMEDICINE (OUTPATIENT)
Dept: INTERNAL MEDICINE | Facility: CLINIC | Age: 26
End: 2021-11-30

## 2021-11-30 VITALS
SYSTOLIC BLOOD PRESSURE: 105 MMHG | WEIGHT: 130 LBS | BODY MASS INDEX: 21.63 KG/M2 | DIASTOLIC BLOOD PRESSURE: 70 MMHG | TEMPERATURE: 97 F

## 2021-11-30 DIAGNOSIS — R41.840 ATTENTION OR CONCENTRATION DEFICIT: ICD-10-CM

## 2021-11-30 PROCEDURE — 99213 OFFICE O/P EST LOW 20 MIN: CPT | Performed by: INTERNAL MEDICINE

## 2022-03-02 DIAGNOSIS — R41.840 ATTENTION OR CONCENTRATION DEFICIT: ICD-10-CM

## 2022-03-02 NOTE — TELEPHONE ENCOUNTER
Caller: Loyda Richmond    Relationship: Self    Best call back number: [phone]    Requested Prescriptions:   Requested Prescriptions     Pending Prescriptions Disp Refills   • lisdexamfetamine (Vyvanse) 30 MG capsule 90 capsule 0     Sig: Take 1 capsule by mouth Every Morning        Pharmacy where request should be sent: [facility]/pharmacy #[id number] - [place], [place] - [address] [facility] [phone] [facility] [phone] FX      Additional details provided by patient:   Does the patient have less than a 3 day supply:  [x] Yes  [] No    Amandeep Kelly Rep   03/02/22 10:42 EST

## 2022-03-21 ENCOUNTER — LAB (OUTPATIENT)
Dept: LAB | Facility: HOSPITAL | Age: 27
End: 2022-03-21

## 2022-03-21 ENCOUNTER — OFFICE VISIT (OUTPATIENT)
Dept: INTERNAL MEDICINE | Facility: CLINIC | Age: 27
End: 2022-03-21

## 2022-03-21 VITALS
HEIGHT: 66 IN | TEMPERATURE: 98 F | DIASTOLIC BLOOD PRESSURE: 70 MMHG | SYSTOLIC BLOOD PRESSURE: 110 MMHG | WEIGHT: 127.5 LBS | RESPIRATION RATE: 20 BRPM | BODY MASS INDEX: 20.49 KG/M2

## 2022-03-21 DIAGNOSIS — Z23 NEED FOR PNEUMOCOCCAL VACCINE: ICD-10-CM

## 2022-03-21 DIAGNOSIS — R41.840 ATTENTION OR CONCENTRATION DEFICIT: ICD-10-CM

## 2022-03-21 DIAGNOSIS — Z13.6 SCREENING FOR CARDIOVASCULAR CONDITION: ICD-10-CM

## 2022-03-21 DIAGNOSIS — Z23 INFLUENZA VACCINE NEEDED: ICD-10-CM

## 2022-03-21 DIAGNOSIS — Z79.899 HIGH RISK MEDICATION USE: ICD-10-CM

## 2022-03-21 DIAGNOSIS — J45.20 MILD INTERMITTENT ASTHMA WITHOUT COMPLICATION: ICD-10-CM

## 2022-03-21 DIAGNOSIS — Z00.00 ENCOUNTER FOR HEALTH MAINTENANCE EXAMINATION IN ADULT: Primary | ICD-10-CM

## 2022-03-21 LAB
ALBUMIN SERPL-MCNC: 4.5 G/DL (ref 3.5–5.2)
ALBUMIN/GLOB SERPL: 1.7 G/DL
ALP SERPL-CCNC: 36 U/L (ref 39–117)
ALT SERPL W P-5'-P-CCNC: 12 U/L (ref 1–33)
ANION GAP SERPL CALCULATED.3IONS-SCNC: 8.5 MMOL/L (ref 5–15)
AST SERPL-CCNC: 17 U/L (ref 1–32)
BASOPHILS # BLD AUTO: 0.03 10*3/MM3 (ref 0–0.2)
BASOPHILS NFR BLD AUTO: 0.5 % (ref 0–1.5)
BILIRUB SERPL-MCNC: 0.3 MG/DL (ref 0–1.2)
BUN SERPL-MCNC: 7 MG/DL (ref 6–20)
BUN/CREAT SERPL: 8.3 (ref 7–25)
CALCIUM SPEC-SCNC: 9.5 MG/DL (ref 8.6–10.5)
CHLORIDE SERPL-SCNC: 102 MMOL/L (ref 98–107)
CO2 SERPL-SCNC: 26.5 MMOL/L (ref 22–29)
CREAT SERPL-MCNC: 0.84 MG/DL (ref 0.57–1)
DEPRECATED RDW RBC AUTO: 39.2 FL (ref 37–54)
EGFRCR SERPLBLD CKD-EPI 2021: 97.2 ML/MIN/1.73
EOSINOPHIL # BLD AUTO: 0.1 10*3/MM3 (ref 0–0.4)
EOSINOPHIL NFR BLD AUTO: 1.5 % (ref 0.3–6.2)
ERYTHROCYTE [DISTWIDTH] IN BLOOD BY AUTOMATED COUNT: 12 % (ref 12.3–15.4)
GLOBULIN UR ELPH-MCNC: 2.6 GM/DL
GLUCOSE SERPL-MCNC: 80 MG/DL (ref 65–99)
HCT VFR BLD AUTO: 41 % (ref 34–46.6)
HGB BLD-MCNC: 14.1 G/DL (ref 12–15.9)
IMM GRANULOCYTES # BLD AUTO: 0.02 10*3/MM3 (ref 0–0.05)
IMM GRANULOCYTES NFR BLD AUTO: 0.3 % (ref 0–0.5)
LYMPHOCYTES # BLD AUTO: 1.49 10*3/MM3 (ref 0.7–3.1)
LYMPHOCYTES NFR BLD AUTO: 22.6 % (ref 19.6–45.3)
MCH RBC QN AUTO: 31 PG (ref 26.6–33)
MCHC RBC AUTO-ENTMCNC: 34.4 G/DL (ref 31.5–35.7)
MCV RBC AUTO: 90.1 FL (ref 79–97)
MONOCYTES # BLD AUTO: 0.33 10*3/MM3 (ref 0.1–0.9)
MONOCYTES NFR BLD AUTO: 5 % (ref 5–12)
NEUTROPHILS NFR BLD AUTO: 4.63 10*3/MM3 (ref 1.7–7)
NEUTROPHILS NFR BLD AUTO: 70.1 % (ref 42.7–76)
NRBC BLD AUTO-RTO: 0 /100 WBC (ref 0–0.2)
PLATELET # BLD AUTO: 275 10*3/MM3 (ref 140–450)
PMV BLD AUTO: 10.2 FL (ref 6–12)
POTASSIUM SERPL-SCNC: 3.9 MMOL/L (ref 3.5–5.2)
PROT SERPL-MCNC: 7.1 G/DL (ref 6–8.5)
RBC # BLD AUTO: 4.55 10*6/MM3 (ref 3.77–5.28)
SODIUM SERPL-SCNC: 137 MMOL/L (ref 136–145)
TSH SERPL DL<=0.05 MIU/L-ACNC: 1.34 UIU/ML (ref 0.27–4.2)
WBC NRBC COR # BLD: 6.6 10*3/MM3 (ref 3.4–10.8)

## 2022-03-21 PROCEDURE — 90732 PPSV23 VACC 2 YRS+ SUBQ/IM: CPT | Performed by: INTERNAL MEDICINE

## 2022-03-21 PROCEDURE — 90471 IMMUNIZATION ADMIN: CPT | Performed by: INTERNAL MEDICINE

## 2022-03-21 PROCEDURE — 90472 IMMUNIZATION ADMIN EACH ADD: CPT | Performed by: INTERNAL MEDICINE

## 2022-03-21 PROCEDURE — 80050 GENERAL HEALTH PANEL: CPT | Performed by: INTERNAL MEDICINE

## 2022-03-21 PROCEDURE — 99395 PREV VISIT EST AGE 18-39: CPT | Performed by: INTERNAL MEDICINE

## 2022-03-21 PROCEDURE — 90686 IIV4 VACC NO PRSV 0.5 ML IM: CPT | Performed by: INTERNAL MEDICINE

## 2022-03-21 NOTE — PATIENT INSTRUCTIONS
Health Maintenance, Female  Adopting a healthy lifestyle and getting preventive care are important in promoting health and wellness. Ask your health care provider about:  The right schedule for you to have regular tests and exams.  Things you can do on your own to prevent diseases and keep yourself healthy.  What should I know about diet, weight, and exercise?  Eat a healthy diet    Eat a diet that includes plenty of vegetables, fruits, low-fat dairy products, and lean protein.  Do not eat a lot of foods that are high in solid fats, added sugars, or sodium.    Maintain a healthy weight  Body mass index (BMI) is used to identify weight problems. It estimates body fat based on height and weight. Your health care provider can help determine your BMI and help you achieve or maintain a healthy weight.  Get regular exercise  Get regular exercise. This is one of the most important things you can do for your health. Most adults should:  Exercise for at least 150 minutes each week. The exercise should increase your heart rate and make you sweat (moderate-intensity exercise).  Do strengthening exercises at least twice a week. This is in addition to the moderate-intensity exercise.  Spend less time sitting. Even light physical activity can be beneficial.  Watch cholesterol and blood lipids  Have your blood tested for lipids and cholesterol at 20 years of age, then have this test every 5 years.  Have your cholesterol levels checked more often if:  Your lipid or cholesterol levels are high.  You are older than 40 years of age.  You are at high risk for heart disease.  What should I know about cancer screening?  Depending on your health history and family history, you may need to have cancer screening at various ages. This may include screening for:  Breast cancer.  Cervical cancer.  Colorectal cancer.  Skin cancer.  Lung cancer.  What should I know about heart disease, diabetes, and high blood pressure?  Blood pressure and heart  disease  High blood pressure causes heart disease and increases the risk of stroke. This is more likely to develop in people who have high blood pressure readings, are of  descent, or are overweight.  Have your blood pressure checked:  Every 3-5 years if you are 18-39 years of age.  Every year if you are 40 years old or older.  Diabetes  Have regular diabetes screenings. This checks your fasting blood sugar level. Have the screening done:  Once every three years after age 40 if you are at a normal weight and have a low risk for diabetes.  More often and at a younger age if you are overweight or have a high risk for diabetes.  What should I know about preventing infection?  Hepatitis B  If you have a higher risk for hepatitis B, you should be screened for this virus. Talk with your health care provider to find out if you are at risk for hepatitis B infection.  Hepatitis C  Testing is recommended for:  Everyone born from 1945 through 1965.  Anyone with known risk factors for hepatitis C.  Sexually transmitted infections (STIs)  Get screened for STIs, including gonorrhea and chlamydia, if:  You are sexually active and are younger than 24 years of age.  You are older than 24 years of age and your health care provider tells you that you are at risk for this type of infection.  Your sexual activity has changed since you were last screened, and you are at increased risk for chlamydia or gonorrhea. Ask your health care provider if you are at risk.  Ask your health care provider about whether you are at high risk for HIV. Your health care provider may recommend a prescription medicine to help prevent HIV infection. If you choose to take medicine to prevent HIV, you should first get tested for HIV. You should then be tested every 3 months for as long as you are taking the medicine.  Pregnancy  If you are about to stop having your period (premenopausal) and you may become pregnant, seek counseling before you get  pregnant.  Take 400 to 800 micrograms (mcg) of folic acid every day if you become pregnant.  Ask for birth control (contraception) if you want to prevent pregnancy.  Osteoporosis and menopause  Osteoporosis is a disease in which the bones lose minerals and strength with aging. This can result in bone fractures. If you are 65 years old or older, or if you are at risk for osteoporosis and fractures, ask your health care provider if you should:  Be screened for bone loss.  Take a calcium or vitamin D supplement to lower your risk of fractures.  Be given hormone replacement therapy (HRT) to treat symptoms of menopause.  Follow these instructions at home:  Lifestyle  Do not use any products that contain nicotine or tobacco, such as cigarettes, e-cigarettes, and chewing tobacco. If you need help quitting, ask your health care provider.  Do not use street drugs.  Do not share needles.  Ask your health care provider for help if you need support or information about quitting drugs.  Alcohol use  Do not drink alcohol if:  Your health care provider tells you not to drink.  You are pregnant, may be pregnant, or are planning to become pregnant.  If you drink alcohol:  Limit how much you use to 0-1 drink a day.  Limit intake if you are breastfeeding.  Be aware of how much alcohol is in your drink. In the [place] one drink equals one 12 oz bottle of beer (355 mL), one 5 oz glass of wine (148 mL), or one 1½ oz glass of hard liquor (44 mL).  General instructions  Schedule regular health, dental, and eye exams.  Stay current with your vaccines.  Tell your health care provider if:  You often feel depressed.  You have ever been abused or do not feel safe at home.  Summary  Adopting a healthy lifestyle and getting preventive care are important in promoting health and wellness.  Follow your health care provider's instructions about healthy diet, exercising, and getting tested or screened for diseases.  Follow your health care provider's  instructions on monitoring your cholesterol and blood pressure.  This information is not intended to replace advice given to you by your health care provider. Make sure you discuss any questions you have with your health care provider.  Document Revised: 12/11/2019 Document Reviewed: 12/11/2019  Elsevier Patient Education © 2021 [employer]

## 2022-03-21 NOTE — PROGRESS NOTES
Subjective     Chief Complaint:  Physical Exam.    History of Present Illness    History obtained from the patient.    The patient was diagnosed with Mild Intermittent Asthma, Allergy Induced, in November 2020 per Dr. Hebert, Allergist.  Symptoms are currently stable on Advair, Atrovent nasal spray, Zyrtec, Singulair, and weekly allergy shots.  She reports chronic nasal congestion, clear rhinorrhea, postnasal drainage, itchy/watery eyes, and intermittent wheezing.  Denies chest pain, chest tightness, shortness of breath, cough, and hemoptysis.    ADD Follow-Up: The patient is here for follow up of ADD, which has been stable.   Interval Events: None.  Symptoms:  Stable difficulty concentrating.  Denies memory loss.  No hyperactivity, restlessness, impulsivity.  Medication: Vyvanse  Side Effects: None.     The patient is taking the following controlled substance(s) on a long term (greater than three months) basis: Lisdexamfetamine (Vyvanse).  She is taking controlled substances for other (ADHD).  A history was obtained from the patient and the following were reviewed: family history, social history, psychiatric history, and substance abuse history.  A baseline assessment was performed and includes a controlled substance agreement, urine drug screen, SAMANTHA (within 12 months prior to today's encounter), and a physical exam, if appropriate, was performed within the past year.  It is medically appropriate to prescribe her the medication(s) above.  If applicable, prior treatment records were obtained and reviewed to justify long term prescribing of controlled substances.  The patient was educated on the following: Limited use of the medication, need to discontinue the medication when her condition resolves, proper storage and disposal of medication(s), and risks and dangers associated with controlled substances including tolerance, dependence, and addiction.  A written informed consent was obtained and includes  "objectives of treatment, further diagnostic testing if appropriate, and an exit strategy for discontinuing the medication on the condition resolves, if appropriate.  In addition, if appropriate, the patient will be screened for other medical conditions that may impact the prescribing of controlled substances.  Previous treatments have been tried including trials of noncontrolled substances or lower doses of controlled substances.  The controlled medication(s) have been titrated to the level appropriate and necessary and the medication(s) are not causing unacceptable side effects.              Loyda Richmond is a 27 y.o. female who presents for an Annual Physical.  She is non-fasting.    PMH, PSH, SocHx, FamHx, Allergies, and Medications: Reviewed and updated.    Outpatient Medications Prior to Visit   Medication Sig Dispense Refill   • B-D ALLERGY SYRINGE 1CC/28G 28G X 1/2\" 1 ML misc See Admin Instructions.     • cetirizine (zyrTEC) 10 MG tablet Take 10 mg by mouth Daily.     • fluticasone-salmeterol (ADVAIR) 250-50 MCG/DOSE DISKUS Inhale 2 (Two) Times a Day.     • ipratropium (ATROVENT) 0.06 % nasal spray 2 sprays into the nostril(s) as directed by provider As Needed for Rhinitis.     • lisdexamfetamine (Vyvanse) 30 MG capsule Take 1 capsule by mouth Every Morning 90 capsule 0   • montelukast (SINGULAIR) 10 MG tablet TAKE ONE TABLET BY MOUTH DAILY 30 tablet 10   • norgestimate-ethinyl estradiol (ORTHO-CYCLEN) 0.25-35 MG-MCG per tablet Take 1 tablet by mouth Daily.     • albuterol sulfate  (90 Base) MCG/ACT inhaler Inhale 2 puffs Every 4 (Four) Hours As Needed for Wheezing. 18 g 5     No facility-administered medications prior to visit.       Immunization History   Administered Date(s) Administered   • COVID-19 (MODERNA) 1st, 2nd, 3rd Dose Only 01/27/2021, 02/26/2021, 12/03/2021   • DTaP, Unspecified 03/30/1998   • Flu Vaccine Quad PF >36MO 10/15/2018   • FluLaval/Fluarix/Fluzone >6 10/02/2019, " 10/09/2020, 03/21/2022   • HPV Quadrivalent 07/24/2007, 09/28/2007, 01/31/2008   • Hepatitis A 07/12/2006, 07/24/2007   • Hepatitis B 1994, 1994, 1994   • MMR 03/30/1998   • Meningococcal Conjugate 07/11/2005, 08/03/2011   • OPV 03/30/1998   • Pneumococcal Polysaccharide (PPSV23) 03/21/2022   • Tdap 07/11/2005, 07/30/2009, 10/02/2019   • Varicella 01/30/1998         Patient Active Problem List   Diagnosis   • Acne   • Allergic rhinitis   • Attention or concentration deficit   • Disorder of vocal cord   • Low back pain   • Chronic diarrhea   • Mild intermittent asthma       Health Habits:  Dental Exam. up to date  Eye Exam. up to date  Hearing Loss:  No  Exercise: 3-4 times/week.  Current exercise activities include: light weights/kettlebells, running/ jogging and walking  Diet: Healthy  Multivitamin: Yes    Safe Driving:  Yes  Seat Belt:  Yes  Bike Helmet:  N/A  Skin Screening:  Yes  Sunscreen: Yes  SBE / GEMMA: No  Sexual Activity:  Yes  Birth Control:  ocp's   STD Prevention:  None (longterm monogamous relationship)    Last Pap: 8/31/2021, normal per patient report (GYN).  Last Mammogram:  N/A  Last DEXA Scan: N/A  Last Colonoscopy: N/A  Last PSA: N/A    Social:    Social History     Socioeconomic History   • Marital status: Single   • Number of children: 0   Tobacco Use   • Smoking status: Never Smoker   • Smokeless tobacco: Never Used   Vaping Use   • Vaping Use: Never used   Substance and Sexual Activity   • Alcohol use: Yes     Comment: 3 drinks (beer or wine) 1 night per week   • Drug use: No   • Sexual activity: Yes     Partners: Male     Birth control/protection: OCP     Comment: Oral pill         Current Medical Providers:    Yoanna Dejesus MD (Internal Medicine / Pediatrics)    The [facility] providers who are involved in the care of this patient are listed above.         Review of Systems   Constitutional: Negative for chills, fatigue, fever and unexpected weight change.        No  night sweats.    HENT: Positive for congestion (chronic w/allergies ), postnasal drip (chronic w/allergies ) and rhinorrhea ( clear, chronic w/allergies ). Negative for ear pain, hearing loss, nosebleeds, sinus pressure, sinus pain, sneezing, sore throat, tinnitus and voice change.         Denies snoring.   Eyes: Positive for discharge (watery, chronic w/allergies ) and itching (chronic w/allergies ). Negative for photophobia, pain, redness and visual disturbance.   Respiratory: Positive for wheezing (occaional). Negative for cough, chest tightness, shortness of breath and stridor.         No chest congestion.  No hemoptysis.   Cardiovascular: Negative for chest pain, palpitations and leg swelling.        No orthopnea, GAYTAN, or PND.  No claudication or syncope.   Gastrointestinal: Positive for diarrhea (chronic). Negative for abdominal pain, blood in stool, constipation, nausea, rectal pain and vomiting.        No melena.  No hematemesis.  No heartburn, dysphagia or odynophagia.  No early satiety, belching, or bloating.    Endocrine: Negative for cold intolerance, heat intolerance, polydipsia, polyphagia and polyuria.        No hair loss or dry skin.  No hot flashes.     Genitourinary: Negative for difficulty urinating, dyspareunia, dysuria, flank pain, frequency, hematuria, menstrual problem, pelvic pain, urgency, vaginal bleeding and vaginal discharge.        No nocturia, incomplete emptying, or incontinence.   Musculoskeletal: Positive for back pain (chronic), neck pain (chronic) and neck stiffness (chronic). Negative for arthralgias, gait problem, joint swelling and myalgias.        No joint stiffness.   Skin: Negative for rash.        No new skin lesions or changes in skin lesions. No breast pain or masses.  No nipple discharge, but has intermittent left nipple inversion, not new.   Neurological: Negative for dizziness, tremors, syncope, speech difficulty, weakness, light-headedness, numbness and headaches.       "  No tingling.  Reports mild memory issues.   Hematological: Negative for adenopathy. Does not bruise/bleed easily.   Psychiatric/Behavioral: Positive for decreased concentration. Negative for confusion, self-injury, sleep disturbance and suicidal ideas. The patient is not nervous/anxious.         No depression.           Objective     Vitals:    03/21/22 1121   BP: 110/70   BP Location: Right arm   Resp: 20   Temp: 98 °F (36.7 °C)   TempSrc: Temporal   Weight: 57.8 kg (127 lb 8 oz)   Height: 168.3 cm (66.25\")   PainSc: 0-No pain       Body mass index is 20.42 kg/m².    Physical Exam  Vitals and nursing note reviewed. Exam conducted with a chaperone present.   Constitutional:       Appearance: Normal appearance. She is well-developed and normal weight.   HENT:      Head: Normocephalic and atraumatic.      Right Ear: Tympanic membrane, ear canal and external ear normal.      Left Ear: Tympanic membrane, ear canal and external ear normal.      Mouth/Throat:      Mouth: Mucous membranes are moist. No oral lesions.      Pharynx: Oropharynx is clear. No oropharyngeal exudate or posterior oropharyngeal erythema.      Comments: Tonsils normal.  Eyes:      General: Lids are normal.      Extraocular Movements: Extraocular movements intact.      Conjunctiva/sclera: Conjunctivae normal.      Pupils: Pupils are equal, round, and reactive to light.   Neck:      Thyroid: No thyroid mass or thyromegaly.      Vascular: No carotid bruit.   Cardiovascular:      Rate and Rhythm: Normal rate and regular rhythm.      Pulses: Normal pulses.      Heart sounds: No murmur heard.    No friction rub. No gallop.   Pulmonary:      Effort: Pulmonary effort is normal.      Breath sounds: Normal breath sounds.   Chest:   Breasts:      Right: No inverted nipple, mass, nipple discharge, skin change, tenderness, axillary adenopathy or supraclavicular adenopathy.      Left: No inverted nipple, mass, nipple discharge, skin change, tenderness, axillary " adenopathy or supraclavicular adenopathy.       Abdominal:      General: Bowel sounds are normal. There is no distension or abdominal bruit.      Palpations: Abdomen is soft. There is no hepatomegaly, splenomegaly or mass.      Tenderness: There is no abdominal tenderness.      Comments: Normal rectal tone. No rectal masses.   Genitourinary:     Exam position: Prone.      Labia:         Right: No rash or lesion.         Left: No rash or lesion.       Vagina: Normal. No vaginal discharge.      Cervix: No cervical motion tenderness, discharge or friability.      Uterus: Normal. Not tender.       Adnexa:         Right: No mass or tenderness.          Left: No mass or tenderness.        Comments: Cervix is without lesions or erythema.  No uterine masses.  Musculoskeletal:         General: No tenderness. Normal range of motion.      Cervical back: Normal range of motion and neck supple.      Right lower leg: No edema.      Left lower leg: No edema.   Lymphadenopathy:      Cervical: No cervical adenopathy.      Upper Body:      Right upper body: No supraclavicular or axillary adenopathy.      Left upper body: No supraclavicular or axillary adenopathy.      Lower Body: No right inguinal adenopathy. No left inguinal adenopathy.   Skin:     Findings: No lesion or rash.      Comments: No atypical skin lesions.   Neurological:      Mental Status: She is alert.      Cranial Nerves: Cranial nerves are intact.      Motor: Motor function is intact. No abnormal muscle tone.      Coordination: Coordination is intact.      Gait: Gait normal.      Deep Tendon Reflexes: Reflexes are normal and symmetric.   Psychiatric:         Mood and Affect: Mood normal.         PHQ-2 Depression Screening  Little interest or pleasure in doing things? 0-->not at all   Feeling down, depressed, or hopeless? 0-->not at all   PHQ-2 Total Score 0         Counseling was given to patient for the following topics:  appropriate exercise, healthy eating habits,  disease prevention, risk factors for cancer, importance of self breast exam and breast health, importance of immunizations, including risks and benefits, sun safety, seatbelt use and safe driving. Also discussed the importance of regular dental and vision care, as well recommendation for a yearly screening skin exam after age 40.  Written information provided to patient on these topics and other health maintenance issues.      Assessment/Plan       Diagnoses and all orders for this visit:    1. Encounter for health maintenance examination in adult (Primary)  -     Comprehensive Metabolic Panel; Future  -     CBC & Differential; Future  -     TSH; Future  -     Comprehensive Metabolic Panel  -     CBC & Differential  -     TSH    2. Attention or concentration deficit   Continue current medication(s) as noted in the history of present illness.    The patient was instructed in the side effects of the medication.  Risks of the potential for tolerance, dependence, and addiction were discussed.  The patient was instructed to take the lowest dosage of the medication, at the lowest frequency, and for the shortest period of time possible.  The patient was instructed not to receive controlled substances or narcotics from other doctors, and not to giveaway or sell the medication.    The patient was instructed to abstain from illicit drug use.  The patient was instructed to avoid alcohol while taking these medications.      Narcotics/controlled substance agreement, Charles report, and Urine Drug Screen were updated today if needed.    3. Mild intermittent asthma without complication   Continue current medication(s) as noted in the history of present illness.   Follow up per Allergy.    4. High risk medication use  -     Compliance Drug Analysis, Ur - Urine, Clean Catch    5. Screening for cardiovascular condition  -     Comprehensive Metabolic Panel  -     CBC & Differential  -     TSH    6. Influenza vaccine needed  -      FluLaval/Fluarix/Fluzone >6 Months    7. Need for pneumococcal vaccine  -     Pneumococcal Polysaccharide Vaccine 23-Valent Greater Than or Equal To 3yo Subcutaneous / IM        Patient's Body mass index is 20.42 kg/m². indicating that she is within normal range (BMI 18.5-24.9). No BMI management plan needed..      The patient was instructed in the side effects of the medication.  Risks of the potential for tolerance, dependence, and addiction were discussed.  The patient was instructed to take the lowest dosage of the medication, at the lowest frequency, and for the shortest period of time possible.  The patient was instructed not to receive controlled substances or narcotics from other doctors, and not to giveaway or sell the medication.    The patient was instructed to abstain from illicit drug use.  The patient was instructed to avoid alcohol while taking these medications.      Narcotics/controlled substance agreement, Charles report, and Urine Drug Screen were updated today if needed.      Return in about 3 months (around 6/21/2022) for Recheck ADD.

## 2022-03-25 LAB — DRUGS UR: NORMAL

## 2022-06-14 DIAGNOSIS — R41.840 ATTENTION OR CONCENTRATION DEFICIT: ICD-10-CM

## 2022-06-14 NOTE — TELEPHONE ENCOUNTER
Caller: Loyda Richmond    Relationship: Self    Best call back number: [phone]    Requested Prescriptions:   Requested Prescriptions     Pending Prescriptions Disp Refills   • lisdexamfetamine (Vyvanse) 30 MG capsule 90 capsule 0     Sig: Take 1 capsule by mouth Every Morning        Pharmacy where request should be sent: [facility] #[id number] - [place], [place] - [address] [phone] [facility] [phone] FX     Additional details provided by patient: SCHEDULED TELEHEALTH July 13    Does the patient have less than a 3 day supply:  [] Yes  [x] No    Amandeep Guerrero Rep   06/14/22 11:55 EDT

## 2022-07-13 ENCOUNTER — TELEMEDICINE (OUTPATIENT)
Dept: INTERNAL MEDICINE | Facility: CLINIC | Age: 27
End: 2022-07-13

## 2022-07-13 VITALS — BODY MASS INDEX: 20.34 KG/M2 | TEMPERATURE: 98 F | WEIGHT: 127 LBS

## 2022-07-13 DIAGNOSIS — R41.840 ATTENTION OR CONCENTRATION DEFICIT: Primary | ICD-10-CM

## 2022-07-13 PROCEDURE — 99213 OFFICE O/P EST LOW 20 MIN: CPT | Performed by: INTERNAL MEDICINE

## 2022-07-13 NOTE — PROGRESS NOTES
Subjective       Loyda Richmond is a 27 y.o. female.     Chief Complaint   Patient presents with   • ADD     3 month follow up       History obtained from the patient.    The patient has chosen to receive care through a Telehealth visit.  The patient consented to use a video/audio connection for his/her medical care today.    The patient presents during the COVID-19 pandemic/federally declared [facility] public health emergency.  This service was conducted via Telehealth audio/visual by I-phone.  Connected to the patient using [employer]Ke[employer]/Zoom.  This visit occurred with the Provider located at [facility] (@ [place], [place]) and the patient located at home in the [facility].  Other participants in this Telehealth visit included: None.      History of Present Illness     ADD Follow-Up: The patient is here for follow up of ADD, which has been stable.   Interval Events: None.  Symptoms:  Stable difficulty concentrating.  Denies memory loss.  No hyperactivity, restlessness, impulsivity.  Medication: Vyvanse  Side Effects: None.     The patient is taking the following controlled substance(s) on a long term (greater than three months) basis: Lisdexamfetamine (Vyvanse).  She is taking controlled substances for other (ADHD).  A history was obtained from the patient and the following were reviewed: family history, social history, psychiatric history, and substance abuse history.  A baseline assessment was performed and includes a controlled substance agreement, urine drug screen, SAMANTHA (within 12 months prior to today's encounter), and a physical exam, if appropriate, was performed within the past year.  It is medically appropriate to prescribe her the medication(s) above.  If applicable, prior treatment records were obtained and reviewed to justify long term prescribing of controlled substances.  The patient was educated on the following: Limited use of the  "medication, need to discontinue the medication when her condition resolves, proper storage and disposal of medication(s), and risks and dangers associated with controlled substances including tolerance, dependence, and addiction.  A written informed consent was obtained and includes objectives of treatment, further diagnostic testing if appropriate, and an exit strategy for discontinuing the medication on the condition resolves, if appropriate.  In addition, if appropriate, the patient will be screened for other medical conditions that may impact the prescribing of controlled substances.  Previous treatments have been tried including trials of noncontrolled substances or lower doses of controlled substances.  The controlled medication(s) have been titrated to the level appropriate and necessary and the medication(s) are not causing unacceptable side effects.      Current Outpatient Medications on File Prior to Visit   Medication Sig Dispense Refill   • albuterol sulfate  (90 Base) MCG/ACT inhaler Inhale 2 puffs Every 4 (Four) Hours As Needed for Wheezing. 18 g 5   • B-D ALLERGY SYRINGE 1CC/28G 28G X 1/2\" 1 ML misc See Admin Instructions.     • cetirizine (zyrTEC) 10 MG tablet Take 10 mg by mouth Daily.     • ipratropium (ATROVENT) 0.06 % nasal spray 2 sprays into the nostril(s) as directed by provider As Needed for Rhinitis.     • lisdexamfetamine (Vyvanse) 30 MG capsule Take 1 capsule by mouth Every Morning 90 capsule 0   • montelukast (SINGULAIR) 10 MG tablet TAKE ONE TABLET BY MOUTH DAILY 30 tablet 10   • norgestimate-ethinyl estradiol (ORTHO-CYCLEN) 0.25-35 MG-MCG per tablet Take 1 tablet by mouth Daily.     • [DISCONTINUED] fluticasone-salmeterol (ADVAIR) 250-50 MCG/DOSE DISKUS Inhale 2 (Two) Times a Day.       No current facility-administered medications on file prior to visit.       Current outpatient and discharge medications have been reconciled for the patient.  Reviewed by: Yoanna Dejesus, " MD        The following portions of the patient's history were reviewed and updated as appropriate: allergies, current medications, past family history, past medical history, past social history, past surgical history and problem list.    Review of Systems   Constitutional: Negative for appetite change, fatigue and unexpected weight change.   Respiratory: Negative for shortness of breath.    Cardiovascular: Negative for chest pain and palpitations.   Gastrointestinal: Negative for abdominal pain, diarrhea, nausea and vomiting.   Neurological: Negative for headaches.        No tics.  No memory loss.   Psychiatric/Behavioral: Positive for decreased concentration. Negative for agitation, confusion, sleep disturbance and suicidal ideas. The patient is not nervous/anxious.         No depression.         Objective       Temperature 98 °F (36.7 °C), temperature source Oral, weight 57.6 kg (127 lb), not currently breastfeeding.  Body mass index is 20.34 kg/m².      Physical Exam  Vitals reviewed.   Pulmonary:      Effort: No respiratory distress.   Neurological:      Mental Status: She is alert.   Psychiatric:         Mood and Affect: Mood normal.         Assessment / Plan:  Diagnoses and all orders for this visit:    1. Attention or concentration deficit (Primary)      Continue current medication(s) as noted in the history of present illness.    The patient was instructed in the side effects of the medication.  Risks of the potential for tolerance, dependence, and addiction were discussed.  The patient was instructed to take the lowest dosage of the medication, at the lowest frequency, and for the shortest period of time possible.  The patient was instructed not to receive controlled substances or narcotics from other doctors, and not to giveaway or sell the medication.    The patient was instructed to abstain from illicit drug use.  The patient was instructed to avoid alcohol while taking these medications.       Narcotics/controlled substance agreement, Charles report, and Urine Drug Screen were updated today if needed.        BMI is within normal parameters. No other follow-up for BMI required.          Return for Next scheduled follow up.

## 2022-09-19 DIAGNOSIS — R41.840 ATTENTION OR CONCENTRATION DEFICIT: ICD-10-CM

## 2022-09-19 NOTE — TELEPHONE ENCOUNTER
Caller: Loyda Richmond    Relationship: Self    Best call back number: [phone]    Requested Prescriptions:   Requested Prescriptions     Pending Prescriptions Disp Refills   • lisdexamfetamine (Vyvanse) 30 MG capsule 90 capsule 0     Sig: Take 1 capsule by mouth Every Morning        Pharmacy where request should be sent: [facility]/PHARMACY #[id number] - [place], [place] - [address] [phone] [facility] [phone] FX     Additional details provided by patient:     Does the patient have less than a 3 day supply:  [x] Yes  [] No    Amandeep Fajardo Rep   09/19/22 11:15 EDT

## 2022-10-19 ENCOUNTER — TELEMEDICINE (OUTPATIENT)
Dept: INTERNAL MEDICINE | Facility: CLINIC | Age: 27
End: 2022-10-19

## 2022-10-19 VITALS — TEMPERATURE: 96 F | WEIGHT: 131 LBS | BODY MASS INDEX: 20.98 KG/M2

## 2022-10-19 DIAGNOSIS — R41.840 ATTENTION OR CONCENTRATION DEFICIT: Primary | ICD-10-CM

## 2022-10-19 PROCEDURE — 99213 OFFICE O/P EST LOW 20 MIN: CPT | Performed by: INTERNAL MEDICINE

## 2022-10-19 NOTE — PROGRESS NOTES
Subjective       Loyda Richmond is a 27 y.o. female.     Chief Complaint   Patient presents with   • ADHD     3 month follow up       History obtained from the patient.    The patient has chosen to receive care through a Telehealth visit.  The patient consented to use a video/audio connection for her medical care today.    The patient presents during the COVID-19 pandemic/federally declared [facility] of public health emergency.  This service was conducted via Telehealth audio/visual by I-phone.  Connected to the patient using [email or web address]OpenLabel.  This visit occurred with the Provider located at [facility] (@ [place], [place]) and the patient located at home in the [facility].  Other participants in this Telehealth visit included: None        History of Present Illness     ADD Follow-Up: The patient is here for follow up of ADD, which has been stable.   Interval Events: None.  Symptoms:  Stable difficulty concentrating.  Denies memory loss.  No hyperactivity, restlessness, impulsivity.  Medication: Vyvanse  Side Effects: None.     The patient is taking the following controlled substance(s) on a long term (greater than three months) basis: Lisdexamfetamine (Vyvanse).  She is taking controlled substances for other (ADHD).  A history was obtained from the patient and the following were reviewed: family history, social history, psychiatric history, and substance abuse history.  A baseline assessment was performed and includes a controlled substance agreement, urine drug screen, SAMANTHA (within 12 months prior to today's encounter), and a physical exam, if appropriate, was performed within the past year.  It is medically appropriate to prescribe her the medication(s) above.  If applicable, prior treatment records were obtained and reviewed to justify long term prescribing of controlled substances.  The patient was educated on the following: Limited use of the  "medication, need to discontinue the medication when her condition resolves, proper storage and disposal of medication(s), and risks and dangers associated with controlled substances including tolerance, dependence, and addiction.  A written informed consent was obtained and includes objectives of treatment, further diagnostic testing if appropriate, and an exit strategy for discontinuing the medication on the condition resolves, if appropriate.  In addition, if appropriate, the patient will be screened for other medical conditions that may impact the prescribing of controlled substances.  Previous treatments have been tried including trials of noncontrolled substances or lower doses of controlled substances.  The controlled medication(s) have been titrated to the level appropriate and necessary and the medication(s) are not causing unacceptable side effects.      Current Outpatient Medications on File Prior to Visit   Medication Sig Dispense Refill   • albuterol sulfate  (90 Base) MCG/ACT inhaler Inhale 2 puffs Every 4 (Four) Hours As Needed for Wheezing. 18 g 5   • B-D ALLERGY SYRINGE 1CC/28G 28G X 1/2\" 1 ML misc See Admin Instructions.     • cetirizine (zyrTEC) 10 MG tablet Take 10 mg by mouth Daily.     • ipratropium (ATROVENT) 0.06 % nasal spray 2 sprays into the nostril(s) as directed by provider As Needed for Rhinitis.     • lisdexamfetamine (Vyvanse) 30 MG capsule Take 1 capsule by mouth Every Morning 90 capsule 0   • montelukast (SINGULAIR) 10 MG tablet TAKE ONE TABLET BY MOUTH DAILY 30 tablet 10   • norgestimate-ethinyl estradiol (ORTHO-CYCLEN) 0.25-35 MG-MCG per tablet Take 1 tablet by mouth Daily.       No current facility-administered medications on file prior to visit.       Current outpatient and discharge medications have been reconciled for the patient.  Reviewed by: Yoanna Dejesus MD        The following portions of the patient's history were reviewed and updated as appropriate: allergies, " current medications, past family history, past medical history, past social history, past surgical history and problem list.    Review of Systems   Constitutional: Negative for appetite change, fatigue and unexpected weight change.   Respiratory: Negative for shortness of breath.    Cardiovascular: Negative for chest pain and palpitations.   Gastrointestinal: Negative for abdominal pain, diarrhea, nausea and vomiting.   Neurological: Negative for headaches.        No tics.  No memory loss.   Psychiatric/Behavioral: Positive for decreased concentration. Negative for agitation, confusion, sleep disturbance and suicidal ideas. The patient is not nervous/anxious.         No depression.         Objective       Temperature 96 °F (35.6 °C), temperature source Temporal, weight 59.4 kg (131 lb), not currently breastfeeding.  Body mass index is 20.98 kg/m².      Physical Exam  Vitals and nursing note reviewed.   Constitutional:       Appearance: She is normal weight.   Psychiatric:         Mood and Affect: Mood normal.         Assessment / Plan:  Diagnoses and all orders for this visit:    1. Attention or concentration deficit (Primary)     Continue current medication(s) as noted in the history of present illness.    The patient was instructed in the side effects of the medication.  Risks of the potential for tolerance, dependence, and addiction were discussed.  The patient was instructed to take the lowest dosage of the medication, at the lowest frequency, and for the shortest period of time possible.  The patient was instructed not to receive controlled substances or narcotics from other doctors, and not to giveaway or sell the medication.    The patient was instructed to abstain from illicit drug use.  The patient was instructed to avoid alcohol while taking these medications.      Narcotics/controlled substance agreement, Charles report, and Urine Drug Screen were updated today if needed.        BMI is within normal  parameters. No other follow-up for BMI required.          Return in about 3 months (around 1/19/2023) for Recheck ADD, in office or video visit.

## 2023-01-06 DIAGNOSIS — R41.840 ATTENTION OR CONCENTRATION DEFICIT: ICD-10-CM

## 2023-01-06 NOTE — TELEPHONE ENCOUNTER
Caller: Loyda Richmond    Relationship: Self    Best call back number: [phone]    Requested Prescriptions:   Requested Prescriptions     Pending Prescriptions Disp Refills   • lisdexamfetamine (Vyvanse) 30 MG capsule 90 capsule 0     Sig: Take 1 capsule by mouth Every Morning        Pharmacy where request should be sent: [facility]/PHARMACY #[id number] - [place], [place] - [address] [phone] [facility] [phone] FX     Additional details provided by patient: PATIENT IS OUT OF THE MEDICATION     Does the patient have less than a 3 day supply:  [x] Yes  [] No    Would you like a call back once the refill request has been completed: [x] Yes [] No    If the office needs to give you a call back, can they leave a voicemail: [x] Yes [] No    Amandeep Stanley Rep   01/06/23 08:34 EST

## 2023-01-23 ENCOUNTER — TELEMEDICINE (OUTPATIENT)
Dept: INTERNAL MEDICINE | Facility: CLINIC | Age: 28
End: 2023-01-23
Payer: COMMERCIAL

## 2023-01-23 VITALS — TEMPERATURE: 97 F | BODY MASS INDEX: 20.82 KG/M2 | WEIGHT: 130 LBS

## 2023-01-23 DIAGNOSIS — R41.840 ATTENTION OR CONCENTRATION DEFICIT: Primary | ICD-10-CM

## 2023-01-23 PROCEDURE — 99213 OFFICE O/P EST LOW 20 MIN: CPT | Performed by: INTERNAL MEDICINE

## 2023-01-23 NOTE — PROGRESS NOTES
Subjective       Loyda Richmond is a 27 y.o. female.     Chief Complaint   Patient presents with   • ADD     Follow up       History obtained from the patient.      The patient has chosen to receive care through a Telehealth visit.  The patient consented to use a video/audio connection for her medical care today.     The patient presents during the COVID-19 pandemic/federally declared [facility] public health emergency.  This service was conducted via Telehealth audio/visual by I-phone.  Connected to the patient using [employer].  This visit occurred with the Provider located at [facility] (@ [place], [place]) and the patient located at home in the [facility].  Other participants in this Telehealth visit included: None      History of Present Illness         ADD Follow-Up: The patient is here for follow up of ADD, which has been stable.   Interval Events: None.  Symptoms:  Stable difficulty concentrating.  Denies memory loss.  No hyperactivity, restlessness, impulsivity.  Medication: Vyvanse  Side Effects: None.     The patient is taking the following controlled substance(s) on a long term (greater than three months) basis: Lisdexamfetamine (Vyvanse).  She is taking controlled substances for other (ADHD).  A history was obtained from the patient and the following were reviewed: family history, social history, psychiatric history, and substance abuse history.  A baseline assessment was performed and includes a controlled substance agreement, urine drug screen, SAMANTHA (within 12 months prior to today's encounter), and a physical exam, if appropriate, was performed within the past year.  It is medically appropriate to prescribe her the medication(s) above.  If applicable, prior treatment records were obtained and reviewed to justify long term prescribing of controlled substances.  The patient was educated on the following: Limited use of the medication,  "need to discontinue the medication when her condition resolves, proper storage and disposal of medication(s), and risks and dangers associated with controlled substances including tolerance, dependence, and addiction.  A written informed consent was obtained and includes objectives of treatment, further diagnostic testing if appropriate, and an exit strategy for discontinuing the medication on the condition resolves, if appropriate.  In addition, if appropriate, the patient will be screened for other medical conditions that may impact the prescribing of controlled substances.  Previous treatments have been tried including trials of noncontrolled substances or lower doses of controlled substances.  The controlled medication(s) have been titrated to the level appropriate and necessary and the medication(s) are not causing unacceptable side effects.         Current Outpatient Medications on File Prior to Visit   Medication Sig Dispense Refill   • albuterol sulfate  (90 Base) MCG/ACT inhaler Inhale 2 puffs Every 4 (Four) Hours As Needed for Wheezing. 18 g 5   • B-D ALLERGY SYRINGE 1CC/28G 28G X 1/2\" 1 ML misc See Admin Instructions.     • cetirizine (zyrTEC) 10 MG tablet Take 10 mg by mouth Daily.     • ipratropium (ATROVENT) 0.06 % nasal spray 2 sprays into the nostril(s) as directed by provider As Needed for Rhinitis.     • lisdexamfetamine (Vyvanse) 30 MG capsule Take 1 capsule by mouth Every Morning 90 capsule 0   • norgestimate-ethinyl estradiol (ORTHO-CYCLEN) 0.25-35 MG-MCG per tablet Take 1 tablet by mouth Daily.     • [DISCONTINUED] montelukast (SINGULAIR) 10 MG tablet TAKE ONE TABLET BY MOUTH DAILY 30 tablet 10     No current facility-administered medications on file prior to visit.       Current outpatient and discharge medications have been reconciled for the patient.  Reviewed by: Yoanna Dejesus MD        The following portions of the patient's history were reviewed and updated as appropriate: " allergies, current medications, past family history, past medical history, past social history, past surgical history and problem list.    Review of Systems   Constitutional: Negative for appetite change, fatigue and unexpected weight change.   Respiratory: Negative for shortness of breath.    Cardiovascular: Negative for chest pain and palpitations.   Gastrointestinal: Negative for abdominal pain, diarrhea, nausea and vomiting.   Neurological: Negative for headaches.        No tics.  No memory loss.   Psychiatric/Behavioral: Positive for decreased concentration. Negative for agitation, confusion, sleep disturbance and suicidal ideas. The patient is not nervous/anxious.         No depression.         Objective       Temperature 97 °F (36.1 °C), temperature source Oral, weight 59 kg (130 lb), not currently breastfeeding.  Body mass index is 20.82 kg/m².      Physical Exam  Vitals reviewed.   Constitutional:       Appearance: She is normal weight.   Pulmonary:      Effort: Pulmonary effort is normal.      Breath sounds: Normal breath sounds.   Neurological:      Mental Status: She is alert.   Psychiatric:         Mood and Affect: Mood normal.         Assessment / Plan:  Diagnoses and all orders for this visit:    1. Attention or concentration deficit (Primary)     Continue current medication(s) as noted in the history of present illness.       The patient was instructed in the side effects of the medication.  Risks of the potential for tolerance, dependence, and addiction were discussed.  The patient was instructed to take the lowest dosage of the medication, at the lowest frequency, and for the shortest period of time possible.  The patient was instructed not to receive controlled substances or narcotics from other doctors, and not to giveaway or sell the medication.    The patient was instructed to abstain from illicit drug use.  The patient was instructed to avoid alcohol while taking these medications.       Narcotics/controlled substance agreement, Charles report, and Urine Drug Screen were updated today if needed.      BMI is within normal parameters. No other follow-up for BMI required.          Return in about 3 months (around 4/23/2023) for Annual physical, fasting.

## 2023-02-10 DIAGNOSIS — R41.840 ATTENTION OR CONCENTRATION DEFICIT: ICD-10-CM

## 2023-02-10 NOTE — TELEPHONE ENCOUNTER
Caller: Loyda Richmond    Relationship: Self    Best call back number: [phone]    Requested Prescriptions:   Requested Prescriptions     Pending Prescriptions Disp Refills   • lisdexamfetamine (Vyvanse) 30 MG capsule 90 capsule 0     Sig: Take 1 capsule by mouth Every Morning        Pharmacy where request should be sent: [facility]/PHARMACY #[id number] - [place], [place] - [address] [phone] [facility] [phone] FX     Additional details provided by patient:     Does the patient have less than a 3 day supply:  [x] Yes  [] No    Would you like a call back once the refill request has been completed: [x] Yes [] No    If the office needs to give you a call back, can they leave a voicemail: [x] Yes [] No    Amandeep Bowman Rep   02/10/23 10:49 EST

## 2023-03-14 DIAGNOSIS — R41.840 ATTENTION OR CONCENTRATION DEFICIT: ICD-10-CM

## 2023-03-14 NOTE — TELEPHONE ENCOUNTER
Caller: Loyda Richmond    Relationship: Self    Best call back number: [phone]    Requested Prescriptions:   Requested Prescriptions     Pending Prescriptions Disp Refills   • lisdexamfetamine (Vyvanse) 30 MG capsule 90 capsule 0     Sig: Take 1 capsule by mouth Every Morning        Pharmacy where request should be sent: [facility]/PHARMACY #[id number] - [place], [place] - [address] [phone] [facility] [phone] FX     Additional details provided by patient: PATIENT IS REQUESTING A REFILL ON ABOVE MEDICATION    Does the patient have less than a 3 day supply:  [x] Yes  [] No    Would you like a call back once the refill request has been completed: [x] Yes [] No    If the office needs to give you a call back, can they leave a voicemail: [x] Yes [] No    Ramila Alas   03/14/23 09:14 EDT